# Patient Record
Sex: MALE | Race: WHITE | NOT HISPANIC OR LATINO | URBAN - METROPOLITAN AREA
[De-identification: names, ages, dates, MRNs, and addresses within clinical notes are randomized per-mention and may not be internally consistent; named-entity substitution may affect disease eponyms.]

---

## 2018-08-01 ENCOUNTER — EMERGENCY (EMERGENCY)
Facility: HOSPITAL | Age: 63
LOS: 0 days | Discharge: HOME | End: 2018-08-01
Attending: EMERGENCY MEDICINE | Admitting: EMERGENCY MEDICINE

## 2018-08-01 VITALS
TEMPERATURE: 98 F | HEART RATE: 57 BPM | SYSTOLIC BLOOD PRESSURE: 94 MMHG | RESPIRATION RATE: 18 BRPM | DIASTOLIC BLOOD PRESSURE: 54 MMHG | HEIGHT: 72 IN | OXYGEN SATURATION: 98 % | WEIGHT: 281.97 LBS

## 2018-08-01 DIAGNOSIS — E78.00 PURE HYPERCHOLESTEROLEMIA, UNSPECIFIED: ICD-10-CM

## 2018-08-01 DIAGNOSIS — R20.0 ANESTHESIA OF SKIN: ICD-10-CM

## 2018-08-01 DIAGNOSIS — G51.0 BELL'S PALSY: ICD-10-CM

## 2018-08-01 DIAGNOSIS — I10 ESSENTIAL (PRIMARY) HYPERTENSION: ICD-10-CM

## 2018-08-01 NOTE — ED PROVIDER NOTE - NEUROLOGICAL COORDINATION
Large Joint Aspiration/Injection  Date/Time: 3/13/2017 1:03 PM  Performed by: WALE BENTON  Authorized by: WALE BENTON     Consent Done?:  Yes (Verbal)  Indications:  Pain  Procedure site marked: Yes    Timeout: Prior to procedure the correct patient, procedure, and site was verified      Location:  Knee  Site:  R knee and L knee  Prep: Patient was prepped and draped in usual sterile fashion    Needle size:  20 G  Approach:  Anterolateral  Medications:  16 mg hyaluronate 16 mg/2 mL; 16 mg hyaluronate 16 mg/2 mL  Patient tolerance:  Patient tolerated the procedure well with no immediate complications      
normal

## 2018-08-01 NOTE — ED PROVIDER NOTE - NS ED ROS FT
Review of Systems    Constitutional: (-) fever/ chills (-) weight loss  Eyes/ENT: (-) blurry vision, (-) epistaxis (-) sore throat (-) ear pain  Cardiovascular: (-) chest pain, (-) syncope (-) palpitations  Respiratory: (-) cough, (-) shortness of breath  Gastrointestinal: (-) vomiting, (-) diarrhea (-) abdominal pain  Musculoskeletal: (-) neck pain, (-) back pain, (-) joint pain (-) pedal edema   Integumentary: (-) rash, (-) swelling  Neurological: (-) headache, (-) altered mental status +facial droop  Psychiatric: (-) hallucinations or depression   Allergic/Immunologic: (-) pruritus

## 2018-08-01 NOTE — ED PROVIDER NOTE - MEDICAL DECISION MAKING DETAILS
Chart finished.  61 yo man with Bell's palsy.  Will need outpatient Lyme testing, etc.  Corticosteroids and discharge.

## 2018-08-01 NOTE — ED ADULT TRIAGE NOTE - CHIEF COMPLAINT QUOTE
Per patient "I think I have [place] Palsy. My smile is off and I feel like when I am drinking , I start to drool and my face feels a little numb" CAITLYN Waller made aware and is assessing patient during triage

## 2018-08-01 NOTE — ED PROVIDER NOTE - ATTENDING CONTRIBUTION TO CARE
I have personally performed a history and physical exam on this patient and personally directed the management of the patient with CAITLYN Montero.  61 yo man with right sided Bell's Palsy.  History of similar in the past.  Already had Rx called in by PMD.  Came in because NP in office was debating imaging of patient.  I feel strongly this is Bell's palsy and do not feel ED imaging is required.  We discussed artifical tears and lacrilube and eye patch at nighttime.  Neurology follow up.

## 2018-08-01 NOTE — ED ADULT NURSE NOTE - CHIEF COMPLAINT QUOTE
Per patient "I think I have Wend[place] Palsy. My smile is off and I feel like when I am drinking , I start to drool and my face feels a little numb" CAITLYN Waller made aware and is assessing patient during triage

## 2018-08-27 PROBLEM — E78.00 PURE HYPERCHOLESTEROLEMIA, UNSPECIFIED: Chronic | Status: ACTIVE | Noted: 2018-08-01

## 2018-08-27 PROBLEM — I10 ESSENTIAL (PRIMARY) HYPERTENSION: Chronic | Status: ACTIVE | Noted: 2018-08-01

## 2018-08-27 PROBLEM — M48.00 SPINAL STENOSIS, SITE UNSPECIFIED: Chronic | Status: ACTIVE | Noted: 2018-08-01

## 2018-08-28 PROBLEM — Z00.00 ENCOUNTER FOR PREVENTIVE HEALTH EXAMINATION: Noted: 2018-08-28

## 2018-09-04 ENCOUNTER — APPOINTMENT (OUTPATIENT)
Dept: SURGERY | Facility: CLINIC | Age: 63
End: 2018-09-04
Payer: COMMERCIAL

## 2018-09-04 VITALS
WEIGHT: 280 LBS | SYSTOLIC BLOOD PRESSURE: 138 MMHG | DIASTOLIC BLOOD PRESSURE: 86 MMHG | BODY MASS INDEX: 37.93 KG/M2 | HEIGHT: 72 IN

## 2018-09-04 PROCEDURE — 99213 OFFICE O/P EST LOW 20 MIN: CPT

## 2018-09-27 ENCOUNTER — APPOINTMENT (OUTPATIENT)
Dept: SURGERY | Facility: CLINIC | Age: 63
End: 2018-09-27

## 2018-10-11 ENCOUNTER — APPOINTMENT (OUTPATIENT)
Dept: SURGERY | Facility: CLINIC | Age: 63
End: 2018-10-11
Payer: COMMERCIAL

## 2018-10-11 VITALS
HEIGHT: 72 IN | DIASTOLIC BLOOD PRESSURE: 78 MMHG | BODY MASS INDEX: 37.79 KG/M2 | WEIGHT: 279 LBS | SYSTOLIC BLOOD PRESSURE: 134 MMHG

## 2018-10-11 PROCEDURE — 99213 OFFICE O/P EST LOW 20 MIN: CPT

## 2018-10-22 ENCOUNTER — FORM ENCOUNTER (OUTPATIENT)
Age: 63
End: 2018-10-22

## 2018-10-23 ENCOUNTER — APPOINTMENT (OUTPATIENT)
Dept: MRI IMAGING | Facility: HOSPITAL | Age: 63
End: 2018-10-23
Payer: COMMERCIAL

## 2018-10-23 ENCOUNTER — APPOINTMENT (OUTPATIENT)
Dept: OTOLARYNGOLOGY | Facility: CLINIC | Age: 63
End: 2018-10-23
Payer: COMMERCIAL

## 2018-10-23 ENCOUNTER — TRANSCRIPTION ENCOUNTER (OUTPATIENT)
Age: 63
End: 2018-10-23

## 2018-10-23 ENCOUNTER — RESULT REVIEW (OUTPATIENT)
Age: 63
End: 2018-10-23

## 2018-10-23 ENCOUNTER — OUTPATIENT (OUTPATIENT)
Dept: OUTPATIENT SERVICES | Facility: HOSPITAL | Age: 63
LOS: 1 days | End: 2018-10-23
Payer: COMMERCIAL

## 2018-10-23 VITALS
HEIGHT: 72 IN | BODY MASS INDEX: 37.65 KG/M2 | TEMPERATURE: 96.9 F | OXYGEN SATURATION: 97 % | DIASTOLIC BLOOD PRESSURE: 88 MMHG | SYSTOLIC BLOOD PRESSURE: 137 MMHG | HEART RATE: 57 BPM | WEIGHT: 278 LBS

## 2018-10-23 DIAGNOSIS — H49.00 THIRD [OCULOMOTOR] NERVE PALSY, UNSPECIFIED EYE: ICD-10-CM

## 2018-10-23 DIAGNOSIS — R42 DIZZINESS AND GIDDINESS: ICD-10-CM

## 2018-10-23 DIAGNOSIS — H81.02 MENIERE'S DISEASE, LEFT EAR: ICD-10-CM

## 2018-10-23 PROCEDURE — 10021 FNA BX W/O IMG GDN 1ST LES: CPT

## 2018-10-23 PROCEDURE — 88305 TISSUE EXAM BY PATHOLOGIST: CPT

## 2018-10-23 PROCEDURE — 88173 CYTOPATH EVAL FNA REPORT: CPT

## 2018-10-23 PROCEDURE — 70543 MRI ORBT/FAC/NCK W/O &W/DYE: CPT

## 2018-10-23 PROCEDURE — A9585: CPT

## 2018-10-23 PROCEDURE — 70543 MRI ORBT/FAC/NCK W/O &W/DYE: CPT | Mod: 26

## 2018-10-23 PROCEDURE — 99244 OFF/OP CNSLTJ NEW/EST MOD 40: CPT | Mod: 25

## 2018-10-25 ENCOUNTER — OUTPATIENT (OUTPATIENT)
Dept: OUTPATIENT SERVICES | Facility: HOSPITAL | Age: 63
LOS: 1 days | Discharge: HOME | End: 2018-10-25

## 2018-10-25 VITALS
DIASTOLIC BLOOD PRESSURE: 70 MMHG | WEIGHT: 281.53 LBS | OXYGEN SATURATION: 96 % | HEIGHT: 72 IN | HEART RATE: 55 BPM | RESPIRATION RATE: 17 BRPM | TEMPERATURE: 98 F | SYSTOLIC BLOOD PRESSURE: 147 MMHG

## 2018-10-25 DIAGNOSIS — K43.2 INCISIONAL HERNIA WITHOUT OBSTRUCTION OR GANGRENE: ICD-10-CM

## 2018-10-25 DIAGNOSIS — Z90.49 ACQUIRED ABSENCE OF OTHER SPECIFIED PARTS OF DIGESTIVE TRACT: Chronic | ICD-10-CM

## 2018-10-25 DIAGNOSIS — Z01.818 ENCOUNTER FOR OTHER PREPROCEDURAL EXAMINATION: ICD-10-CM

## 2018-10-25 DIAGNOSIS — Z96.649 PRESENCE OF UNSPECIFIED ARTIFICIAL HIP JOINT: Chronic | ICD-10-CM

## 2018-10-25 DIAGNOSIS — Z98.890 OTHER SPECIFIED POSTPROCEDURAL STATES: Chronic | ICD-10-CM

## 2018-10-25 LAB
APTT BLD: 32.4 SEC — SIGNIFICANT CHANGE UP (ref 27–39.2)
INR BLD: 1 RATIO — SIGNIFICANT CHANGE UP (ref 0.65–1.3)
NON-GYNECOLOGICAL CYTOLOGY STUDY: SIGNIFICANT CHANGE UP
PROTHROM AB SERPL-ACNC: 11.5 SEC — SIGNIFICANT CHANGE UP (ref 9.95–12.87)

## 2018-10-25 RX ORDER — FAMOTIDINE 10 MG/ML
1 INJECTION INTRAVENOUS
Qty: 0 | Refills: 0 | COMMUNITY

## 2018-10-25 NOTE — H&P PST ADULT - PSH
H/O hemorrhoidectomy  2013  History of laparoscopic cholecystectomy  2017  History of total hip replacement  left 2002

## 2018-10-25 NOTE — H&P PST ADULT - FAMILY HISTORY
Father  Still living? Unknown  COPD (chronic obstructive pulmonary disease), Age at diagnosis: Age Unknown     Mother  Still living? No  COPD (chronic obstructive pulmonary disease), Age at diagnosis: Age Unknown

## 2018-10-25 NOTE — H&P PST ADULT - REASON FOR ADMISSION
64 Y/O MALE HERE FOR PRE-ADMISSION SURGICAL TESTING. PATIENT REPORTS HE HAD A LAPAROSCOPIC CHOLECYSTECTOMY IN 2017. REPORTS RECENT HERNIA NEAR INCISION.  NOW FOR SCHEDULED PROCEDURE.

## 2018-10-25 NOTE — H&P PST ADULT - PMH
Bell palsy  8/18  GERD (gastroesophageal reflux disease)    High blood cholesterol    Hypertension    OA (osteoarthritis)    Obesity  BMI=38.2  LAURIE on CPAP    Spinal stenosis

## 2018-10-26 PROBLEM — E66.9 OBESITY, UNSPECIFIED: Chronic | Status: ACTIVE | Noted: 2018-10-25

## 2018-11-06 ENCOUNTER — RESULT REVIEW (OUTPATIENT)
Age: 63
End: 2018-11-06

## 2018-11-06 ENCOUNTER — APPOINTMENT (OUTPATIENT)
Dept: SURGERY | Facility: AMBULATORY SURGERY CENTER | Age: 63
End: 2018-11-06

## 2018-11-06 ENCOUNTER — OUTPATIENT (OUTPATIENT)
Dept: OUTPATIENT SERVICES | Facility: HOSPITAL | Age: 63
LOS: 1 days | Discharge: HOME | End: 2018-11-06
Payer: COMMERCIAL

## 2018-11-06 VITALS
RESPIRATION RATE: 20 BRPM | OXYGEN SATURATION: 98 % | TEMPERATURE: 98 F | SYSTOLIC BLOOD PRESSURE: 143 MMHG | HEART RATE: 53 BPM | HEIGHT: 72 IN | DIASTOLIC BLOOD PRESSURE: 68 MMHG | WEIGHT: 281.53 LBS

## 2018-11-06 VITALS
DIASTOLIC BLOOD PRESSURE: 92 MMHG | RESPIRATION RATE: 16 BRPM | SYSTOLIC BLOOD PRESSURE: 145 MMHG | OXYGEN SATURATION: 96 % | HEART RATE: 61 BPM

## 2018-11-06 DIAGNOSIS — Z96.649 PRESENCE OF UNSPECIFIED ARTIFICIAL HIP JOINT: Chronic | ICD-10-CM

## 2018-11-06 DIAGNOSIS — Z90.49 ACQUIRED ABSENCE OF OTHER SPECIFIED PARTS OF DIGESTIVE TRACT: Chronic | ICD-10-CM

## 2018-11-06 DIAGNOSIS — Z98.890 OTHER SPECIFIED POSTPROCEDURAL STATES: Chronic | ICD-10-CM

## 2018-11-06 PROCEDURE — 49560: CPT

## 2018-11-06 PROCEDURE — 12032 INTMD RPR S/A/T/EXT 2.6-7.5: CPT | Mod: 59

## 2018-11-06 PROCEDURE — 49568: CPT

## 2018-11-06 RX ORDER — ONDANSETRON 8 MG/1
4 TABLET, FILM COATED ORAL ONCE
Qty: 0 | Refills: 0 | Status: DISCONTINUED | OUTPATIENT
Start: 2018-11-06 | End: 2018-11-21

## 2018-11-06 RX ORDER — MORPHINE SULFATE 50 MG/1
2 CAPSULE, EXTENDED RELEASE ORAL
Qty: 0 | Refills: 0 | Status: DISCONTINUED | OUTPATIENT
Start: 2018-11-06 | End: 2018-11-06

## 2018-11-06 RX ORDER — OXYCODONE AND ACETAMINOPHEN 5; 325 MG/1; MG/1
1 TABLET ORAL EVERY 4 HOURS
Qty: 0 | Refills: 0 | Status: DISCONTINUED | OUTPATIENT
Start: 2018-11-06 | End: 2018-11-06

## 2018-11-06 RX ORDER — SODIUM CHLORIDE 9 MG/ML
1000 INJECTION, SOLUTION INTRAVENOUS
Qty: 0 | Refills: 0 | Status: DISCONTINUED | OUTPATIENT
Start: 2018-11-06 | End: 2018-11-21

## 2018-11-06 RX ADMIN — MORPHINE SULFATE 2 MILLIGRAM(S): 50 CAPSULE, EXTENDED RELEASE ORAL at 14:46

## 2018-11-06 RX ADMIN — OXYCODONE AND ACETAMINOPHEN 1 TABLET(S): 5; 325 TABLET ORAL at 14:47

## 2018-11-06 RX ADMIN — SODIUM CHLORIDE 100 MILLILITER(S): 9 INJECTION, SOLUTION INTRAVENOUS at 13:46

## 2018-11-06 RX ADMIN — OXYCODONE AND ACETAMINOPHEN 1 TABLET(S): 5; 325 TABLET ORAL at 14:46

## 2018-11-06 RX ADMIN — MORPHINE SULFATE 2 MILLIGRAM(S): 50 CAPSULE, EXTENDED RELEASE ORAL at 13:48

## 2018-11-06 NOTE — CHART NOTE - NSCHARTNOTEFT_GEN_A_CORE
PACU ANESTHESIA ADMISSION NOTE      Procedure: repair of incisional hernia with mesh  Post op diagnosis: incisional hernia     ____  Intubated  TV:______       Rate: ______      FiO2: ______    __x__  Patent Airway    __x__  Full return of protective reflexes    __x__  Full recovery from anesthesia / back to baseline     Vitals:   T:      97.8     R:     24             BP:    131/72              Sat:   99%                P: 57      Mental Status:  __x__ Awake   ___x__ Alert   _____ Drowsy   _____ Sedated    Nausea/Vomiting:  _x___ NO  ______Yes,   See Post - Op Orders          Pain Scale (0-10):  ___0__    Treatment: ____ None    ____ See Post - Op/PCA Orders    Post - Operative Fluids:   ____ Oral   ____x See Post - Op Orders    Plan: Discharge:   __x__Home       _____Floor     _____Critical Care    _____  Other:_________________    Comments: Pt with no acute s/s of anesthesia complications. Report endorsed to RN.

## 2018-11-06 NOTE — BRIEF OPERATIVE NOTE - PROCEDURE
<<-----Click on this checkbox to enter Procedure Incisional hernia repair with mesh  11/06/2018    Active  MAURICE

## 2018-11-07 PROBLEM — G47.33 OBSTRUCTIVE SLEEP APNEA (ADULT) (PEDIATRIC): Chronic | Status: ACTIVE | Noted: 2018-10-25

## 2018-11-07 PROBLEM — G51.0 BELL'S PALSY: Chronic | Status: ACTIVE | Noted: 2018-10-25

## 2018-11-07 PROBLEM — M19.90 UNSPECIFIED OSTEOARTHRITIS, UNSPECIFIED SITE: Chronic | Status: ACTIVE | Noted: 2018-10-25

## 2018-11-07 PROBLEM — K21.9 GASTRO-ESOPHAGEAL REFLUX DISEASE WITHOUT ESOPHAGITIS: Chronic | Status: ACTIVE | Noted: 2018-10-25

## 2018-11-08 LAB — SURGICAL PATHOLOGY STUDY: SIGNIFICANT CHANGE UP

## 2018-11-09 DIAGNOSIS — K43.2 INCISIONAL HERNIA WITHOUT OBSTRUCTION OR GANGRENE: ICD-10-CM

## 2018-11-09 DIAGNOSIS — E78.00 PURE HYPERCHOLESTEROLEMIA, UNSPECIFIED: ICD-10-CM

## 2018-11-09 DIAGNOSIS — G47.33 OBSTRUCTIVE SLEEP APNEA (ADULT) (PEDIATRIC): ICD-10-CM

## 2018-11-09 DIAGNOSIS — E66.9 OBESITY, UNSPECIFIED: ICD-10-CM

## 2018-11-09 DIAGNOSIS — I10 ESSENTIAL (PRIMARY) HYPERTENSION: ICD-10-CM

## 2018-11-19 ENCOUNTER — APPOINTMENT (OUTPATIENT)
Dept: SURGERY | Facility: CLINIC | Age: 63
End: 2018-11-19
Payer: COMMERCIAL

## 2018-11-19 VITALS
SYSTOLIC BLOOD PRESSURE: 122 MMHG | WEIGHT: 281 LBS | HEIGHT: 72 IN | BODY MASS INDEX: 38.06 KG/M2 | DIASTOLIC BLOOD PRESSURE: 80 MMHG

## 2018-11-19 PROCEDURE — 99024 POSTOP FOLLOW-UP VISIT: CPT

## 2018-12-17 ENCOUNTER — RESULT REVIEW (OUTPATIENT)
Age: 63
End: 2018-12-17

## 2018-12-17 ENCOUNTER — OUTPATIENT (OUTPATIENT)
Dept: OUTPATIENT SERVICES | Facility: HOSPITAL | Age: 63
LOS: 1 days | Discharge: HOME | End: 2018-12-17

## 2018-12-17 ENCOUNTER — APPOINTMENT (OUTPATIENT)
Dept: OTOLARYNGOLOGY | Facility: AMBULATORY SURGERY CENTER | Age: 63
End: 2018-12-17
Payer: COMMERCIAL

## 2018-12-17 DIAGNOSIS — Z98.890 OTHER SPECIFIED POSTPROCEDURAL STATES: Chronic | ICD-10-CM

## 2018-12-17 DIAGNOSIS — Z90.49 ACQUIRED ABSENCE OF OTHER SPECIFIED PARTS OF DIGESTIVE TRACT: Chronic | ICD-10-CM

## 2018-12-17 DIAGNOSIS — Z96.649 PRESENCE OF UNSPECIFIED ARTIFICIAL HIP JOINT: Chronic | ICD-10-CM

## 2018-12-17 PROCEDURE — 10021 FNA BX W/O IMG GDN 1ST LES: CPT

## 2018-12-19 LAB — NON-GYNECOLOGICAL CYTOLOGY STUDY: SIGNIFICANT CHANGE UP

## 2019-01-03 ENCOUNTER — APPOINTMENT (OUTPATIENT)
Dept: SURGERY | Facility: CLINIC | Age: 64
End: 2019-01-03
Payer: COMMERCIAL

## 2019-01-03 VITALS
DIASTOLIC BLOOD PRESSURE: 82 MMHG | HEIGHT: 72 IN | WEIGHT: 282 LBS | SYSTOLIC BLOOD PRESSURE: 140 MMHG | BODY MASS INDEX: 38.19 KG/M2

## 2019-01-03 PROCEDURE — 99024 POSTOP FOLLOW-UP VISIT: CPT

## 2019-04-01 ENCOUNTER — TRANSCRIPTION ENCOUNTER (OUTPATIENT)
Age: 64
End: 2019-04-01

## 2019-04-11 ENCOUNTER — APPOINTMENT (OUTPATIENT)
Dept: SURGERY | Facility: CLINIC | Age: 64
End: 2019-04-11
Payer: COMMERCIAL

## 2019-04-11 VITALS
DIASTOLIC BLOOD PRESSURE: 78 MMHG | WEIGHT: 277 LBS | HEIGHT: 72 IN | SYSTOLIC BLOOD PRESSURE: 136 MMHG | BODY MASS INDEX: 37.52 KG/M2

## 2019-04-11 DIAGNOSIS — K43.2 INCISIONAL HERNIA W/OUT OBSTRUCTION OR GANGRENE: ICD-10-CM

## 2019-04-11 DIAGNOSIS — G51.0 BELL'S PALSY: ICD-10-CM

## 2019-04-11 PROCEDURE — 99212 OFFICE O/P EST SF 10 MIN: CPT

## 2019-04-11 NOTE — PHYSICAL EXAM
[Abdominal Masses] : No abdominal masses [Abdomen Tenderness] : ~T ~M No abdominal tenderness [No HSM] : no hepatosplenomegaly [de-identified] : Obese and protuberant but soft. Midline incision continues to heal without problems. The seroma has resolved and the hernia repair is intact. Minimal residual postsurgical changes or palpable. The large diastases recti is unchanged and not problematic. No inguinal hernias are noted.

## 2019-04-11 NOTE — HISTORY OF PRESENT ILLNESS
[de-identified] : Pt feels well overall.  No new problems reported.  Right Holder's Palsy effects much improved.  He had a biopsy of a lipoma in his right cheek by an ENT in [place] which was benign and no surgery was advised.

## 2022-06-22 ENCOUNTER — APPOINTMENT (OUTPATIENT)
Age: 67
End: 2022-06-22

## 2022-06-22 DIAGNOSIS — G47.33 OBSTRUCTIVE SLEEP APNEA (ADULT) (PEDIATRIC): ICD-10-CM

## 2022-06-22 PROCEDURE — 99212 OFFICE O/P EST SF 10 MIN: CPT | Mod: 95

## 2022-06-22 NOTE — ASSESSMENT
[FreeTextEntry1] : Assessment:\par LAURIE\par Obesity\par \par PLAN:\par The patient is benefitting from the PAP device . Unit broken beyond repair\par New supplies ordered \par Weight loss discussed\par I stressed the need maintain compliance  with the PAP device.\par The patient is not to use an Ozone or UV sterilizer. \par F/U in 12 months\par \par total phone call  time 12min\par \par \par

## 2022-06-22 NOTE — REASON FOR VISIT
[Follow-Up] : a follow-up visit [Sleep Apnea] : sleep apnea [Obesity] : obesity [TextBox_44] : error message on the cpap

## 2022-06-22 NOTE — HISTORY OF PRESENT ILLNESS
[Home] : at home, [unfilled] , at the time of the visit. [Medical Office: ([facility])___] : at the medical office located in  [Verbal consent obtained from patient] : the patient, [unfilled] [TextBox_4] : I reviewed all the previous sleep test that are available on file.\par I reviewed the previous office notes.\par

## 2022-10-17 ENCOUNTER — APPOINTMENT (OUTPATIENT)
Dept: CARDIOLOGY | Facility: CLINIC | Age: 67
End: 2022-10-17

## 2022-10-21 ENCOUNTER — APPOINTMENT (OUTPATIENT)
Dept: CARDIOLOGY | Facility: CLINIC | Age: 67
End: 2022-10-21

## 2022-10-21 VITALS — BODY MASS INDEX: 37.25 KG/M2 | WEIGHT: 275 LBS | HEIGHT: 72 IN

## 2022-10-21 DIAGNOSIS — G47.00 INSOMNIA, UNSPECIFIED: ICD-10-CM

## 2022-10-21 DIAGNOSIS — G45.0 VERTEBRO-BASILAR ARTERY SYNDROME: ICD-10-CM

## 2022-10-21 DIAGNOSIS — K21.9 GASTRO-ESOPHAGEAL REFLUX DISEASE W/OUT ESOPHAGITIS: ICD-10-CM

## 2022-10-21 DIAGNOSIS — K64.9 UNSPECIFIED HEMORRHOIDS: ICD-10-CM

## 2022-10-21 PROCEDURE — 99204 OFFICE O/P NEW MOD 45 MIN: CPT

## 2022-10-21 PROCEDURE — 99214 OFFICE O/P EST MOD 30 MIN: CPT

## 2022-10-21 RX ORDER — ZOLPIDEM TARTRATE 10 MG/1
10 TABLET ORAL
Qty: 30 | Refills: 0 | Status: DISCONTINUED | COMMUNITY
Start: 2018-05-17 | End: 2022-10-21

## 2022-10-21 RX ORDER — AMLODIPINE BESYLATE 5 MG/1
5 TABLET ORAL
Qty: 90 | Refills: 0 | Status: DISCONTINUED | COMMUNITY
Start: 2018-05-21 | End: 2022-10-21

## 2022-10-21 RX ORDER — VALACYCLOVIR 1 G/1
1 TABLET, FILM COATED ORAL
Qty: 21 | Refills: 0 | Status: DISCONTINUED | COMMUNITY
Start: 2018-08-01 | End: 2022-10-21

## 2022-10-21 RX ORDER — METHYLPREDNISOLONE 4 MG/1
4 TABLET ORAL
Qty: 21 | Refills: 0 | Status: DISCONTINUED | COMMUNITY
Start: 2018-08-14 | End: 2022-10-21

## 2022-10-21 RX ORDER — PREDNISONE 20 MG/1
20 TABLET ORAL
Qty: 14 | Refills: 0 | Status: DISCONTINUED | COMMUNITY
Start: 2018-08-01 | End: 2022-10-21

## 2022-10-21 RX ORDER — FENOFIBRIC ACID 135 MG/1
135 CAPSULE, DELAYED RELEASE ORAL
Qty: 90 | Refills: 0 | Status: DISCONTINUED | COMMUNITY
Start: 2018-01-29 | End: 2022-10-21

## 2022-10-21 RX ORDER — VALSARTAN AND HYDROCHLOROTHIAZIDE 320; 25 MG/1; MG/1
320-25 TABLET, FILM COATED ORAL
Qty: 90 | Refills: 0 | Status: DISCONTINUED | COMMUNITY
Start: 2018-01-02 | End: 2022-10-21

## 2022-10-21 NOTE — PHYSICAL EXAM
[Normal S1, S2] : normal S1, S2 [Clear Lung Fields] : clear lung fields [Soft] : abdomen soft [de-identified] : RRR

## 2022-10-21 NOTE — HISTORY OF PRESENT ILLNESS
[FreeTextEntry1] : PT WITH CAD 60% PDA, HYPERTG, HTN, WCH, LAURIE ON CPAP, MULTIPLE HIP AND KNEE SURGERIES. PAF STARTED XARELTO  \par \par MONITOR in 7/222: No AFIB EPISODES \par pt gettting tired at times, pt deneis cp and denies sob, pt to stop aspirin \par echo: 2/22: MILD LVH, LVMI: 133 g/m2, GS: -17.9%

## 2022-11-01 ENCOUNTER — APPOINTMENT (OUTPATIENT)
Dept: CARDIOLOGY | Facility: CLINIC | Age: 67
End: 2022-11-01

## 2022-11-01 VITALS
WEIGHT: 275 LBS | HEART RATE: 61 BPM | HEIGHT: 72 IN | TEMPERATURE: 98.1 F | DIASTOLIC BLOOD PRESSURE: 90 MMHG | BODY MASS INDEX: 37.25 KG/M2 | SYSTOLIC BLOOD PRESSURE: 132 MMHG

## 2022-11-01 DIAGNOSIS — Z78.9 OTHER SPECIFIED HEALTH STATUS: ICD-10-CM

## 2022-11-01 DIAGNOSIS — Z82.49 FAMILY HISTORY OF ISCHEMIC HEART DISEASE AND OTHER DISEASES OF THE CIRCULATORY SYSTEM: ICD-10-CM

## 2022-11-01 DIAGNOSIS — Z80.1 FAMILY HISTORY OF MALIGNANT NEOPLASM OF TRACHEA, BRONCHUS AND LUNG: ICD-10-CM

## 2022-11-01 DIAGNOSIS — Z82.5 FAMILY HISTORY OF ASTHMA AND OTHER CHRONIC LOWER RESPIRATORY DISEASES: ICD-10-CM

## 2022-11-01 PROCEDURE — 93000 ELECTROCARDIOGRAM COMPLETE: CPT

## 2022-11-01 PROCEDURE — 99204 OFFICE O/P NEW MOD 45 MIN: CPT

## 2022-11-01 RX ORDER — ZOLPIDEM TARTRATE 10 MG/1
10 TABLET, FILM COATED ORAL
Refills: 0 | Status: COMPLETED | COMMUNITY
End: 2022-11-01

## 2022-11-01 RX ORDER — FAMOTIDINE 40 MG/1
40 TABLET, FILM COATED ORAL
Refills: 0 | Status: COMPLETED | COMMUNITY
End: 2022-11-01

## 2022-11-01 RX ORDER — TADALAFIL 10 MG/1
10 TABLET, FILM COATED ORAL
Refills: 0 | Status: COMPLETED | COMMUNITY
End: 2022-11-01

## 2022-11-01 RX ORDER — VALSARTAN AND HYDROCHLOROTHIAZIDE 320; 25 MG/1; MG/1
320-25 TABLET, FILM COATED ORAL
Refills: 0 | Status: COMPLETED | COMMUNITY
End: 2022-11-01

## 2022-11-01 RX ORDER — MINERAL OIL, PETROLATUM 425; 568 MG/G; MG/G
OINTMENT OPHTHALMIC
Qty: 1 | Refills: 3 | Status: COMPLETED | COMMUNITY
Start: 2018-10-23 | End: 2022-11-01

## 2022-11-01 RX ORDER — FAMOTIDINE 40 MG/5ML
40 POWDER, FOR SUSPENSION ORAL
Refills: 0 | Status: COMPLETED | COMMUNITY
End: 2022-11-01

## 2022-11-01 NOTE — CARDIOLOGY SUMMARY
[de-identified] : (11/1/2022) EKG sinus rhythm at 61 BPM, poor R-wave progression, no significant ST/T wave abnormalities. [de-identified] : (6/24/2022) 14 days MCOT showed no evidence of Atrial Fibrillation. Average heart 64 BPM. Range  BPM.         [de-identified] : (2/17/2022) EF 55-60% borderline LA dilatation. No significant valvular abnormalities.

## 2022-11-01 NOTE — HISTORY OF PRESENT ILLNESS
[FreeTextEntry1] : Hypertension, Morbid Obesity, Obstructive Sleep Apnea on CPAP compliant, Hyperlipidemia, new onset Atrial Fibrillation. Patient was diagnosed with Atrial Fibrillation with apple watch. She had an episode on 6/2/2022. I reviewed the tracing from 6/2/2022 at 7:20 pm and 8:40 pm and it appears that the patient was in Atrial Fibrillation for 1 hour and 20 minutes. No other episode suggestive of Atrial Fibrillation.\par \par Patient CHADSVASC Score is 2 and he was started on Xarelto.  \par \par During the episode of AFib, patient felt a little bit of fluttering on the chest and felt funny. \par \par Patient has no chest pain, no shortness of breath, no lightheadedness, no dizziness, no palpitations, and no syncope. \par \par He presents for the evaluation of Atrial Fibrillation.

## 2022-11-01 NOTE — ADDENDUM
[FreeTextEntry1] : IRoyer assisted in documentation on 11/01/2022  acting as a scribe for Ladonna Martinez.\par

## 2022-11-01 NOTE — DISCUSSION/SUMMARY
[FreeTextEntry1] : Mr. Jeremiah Villa is a pleasant 67-year-old male with Hypertension, Hyperlipidemia, Morbid Obesity, Obstructive Sleep Apnea on CPAP, and new onset Paroxysmal Atrial Fibrillation. \par \par Atrial Fibrillation was diagnosed once with apple watch on 6/2/2022. Tracing reviewed showed Atrial Fibrillation lasting for 1 hour and 20 minutes most likely. Patient CHADSVASC Score is 2 and he is on Xarelto.\par \par I recommend to continue Xarelto for stroke prevention. I discussed with patient the strategy of reduction of stroke at length and deferred options for stroke prevention.\par \par I recommend to continue the same medication.  \par \par I discussed with patient the management strategy of Atrial Fibrillation at length including medical therapy antiemetic treatment and catheter ablation. I also discussed with patient at length the prevention strategy of Atrial Fibrillation including control of Hemoglobin A1C, Hypertension, Hyperlipidemia, daily exercise 30 minutes 5 days a week, avoiding alcohol, treating sleep apnea, and healthy eating and weight loss. Patient last Hemoglobin A1C 4.9 and last LDL 73.\par \par Patient and wife agreed for now for observation and monitoring AF burden using apple watch. Patient is not interested in an implantable recorder at this time. If patient has additional episode of AFib, I will consider him for Catheter Ablation.   \par \par I discussed with patient plan of care in great details. I answered all his questions to his satisfaction. Patient was pleased with the visit.\par \par Patient will follow with me in 6 months’ time. Please do not hesitate to contact me at [phone] if you have any further questions regarding this patient care.\par

## 2022-11-23 ENCOUNTER — APPOINTMENT (OUTPATIENT)
Dept: CARDIOLOGY | Facility: CLINIC | Age: 67
End: 2022-11-23

## 2022-11-23 VITALS — BODY MASS INDEX: 36.57 KG/M2 | HEIGHT: 72 IN | WEIGHT: 270 LBS

## 2022-11-23 PROCEDURE — 99213 OFFICE O/P EST LOW 20 MIN: CPT

## 2022-11-23 RX ORDER — ROSUVASTATIN CALCIUM 5 MG/1
5 TABLET, FILM COATED ORAL
Qty: 90 | Refills: 0 | Status: COMPLETED | COMMUNITY
Start: 2022-06-23

## 2022-11-23 RX ORDER — TADALAFIL 10 MG/1
10 TABLET, FILM COATED ORAL
Refills: 0 | Status: COMPLETED | COMMUNITY

## 2022-11-23 RX ORDER — KETOCONAZOLE 20 MG/G
2 CREAM TOPICAL
Qty: 60 | Refills: 0 | Status: COMPLETED | COMMUNITY
Start: 2022-07-26

## 2022-11-23 RX ORDER — COVID-19 ANTIGEN TEST
KIT MISCELLANEOUS
Qty: 8 | Refills: 0 | Status: COMPLETED | COMMUNITY
Start: 2022-08-29

## 2022-11-23 NOTE — PHYSICAL EXAM
[Normal S1, S2] : normal S1, S2 [Clear Lung Fields] : clear lung fields [Soft] : abdomen soft [de-identified] : RRR

## 2022-11-23 NOTE — HISTORY OF PRESENT ILLNESS
[FreeTextEntry1] : PT WITH CAD 60% PDA, HYPERTG, HTN, WCH, LAURIE ON CPAP, MULTIPLE HIP AND KNEE SURGERIES. PAF STARTED XARELTO  \par \par MONITOR in 7/222: No AFIB EPISODES \par pt gettting tired at times, pt deneis cp and denies sob, pt to stop aspirin \par echo: 2/22: MILD LVH, LVMI: 133 g/m2, GS: -17.9%\par \par 11/23/22: \par pt saw dr. sow and does not want ILR for now, pt had no episodes of afib on apple watch, pt was advised to continue for now \par checked BP CUFF ACCURATE WITH OFFICE, BP HIGH TODAY AND AT HOME 'S AT TIME.

## 2022-11-23 NOTE — DISCUSSION/SUMMARY
[FreeTextEntry1] : F/U IN 1 MONTH \par ? masked vs inaccurate \par bring cuff \par \par pt CUFF ACCURATE \par pt to lose weight \par continue xarelto for now \par get 2 d echo as LVH last time to see if improvement LVMI \par pt says sometimes misses lunch dose of hydralazine 4 times/week\par take daily \par no change if pressure staying avoe 140/90 consistently do best to take lunch dose of hydralazine. \par

## 2023-02-24 ENCOUNTER — APPOINTMENT (OUTPATIENT)
Dept: CARDIOLOGY | Facility: CLINIC | Age: 68
End: 2023-02-24
Payer: MEDICARE

## 2023-02-24 VITALS — DIASTOLIC BLOOD PRESSURE: 80 MMHG | SYSTOLIC BLOOD PRESSURE: 120 MMHG | HEART RATE: 60 BPM

## 2023-02-24 VITALS — BODY MASS INDEX: 36.98 KG/M2 | WEIGHT: 273 LBS | HEIGHT: 72 IN

## 2023-02-24 DIAGNOSIS — R00.2 PALPITATIONS: ICD-10-CM

## 2023-02-24 PROCEDURE — 99214 OFFICE O/P EST MOD 30 MIN: CPT

## 2023-03-08 ENCOUNTER — RESULT REVIEW (OUTPATIENT)
Age: 68
End: 2023-03-08

## 2023-03-08 ENCOUNTER — OUTPATIENT (OUTPATIENT)
Dept: OUTPATIENT SERVICES | Facility: HOSPITAL | Age: 68
LOS: 1 days | End: 2023-03-08
Payer: MEDICARE

## 2023-03-08 DIAGNOSIS — Z00.8 ENCOUNTER FOR OTHER GENERAL EXAMINATION: ICD-10-CM

## 2023-03-08 DIAGNOSIS — Z98.890 OTHER SPECIFIED POSTPROCEDURAL STATES: Chronic | ICD-10-CM

## 2023-03-08 DIAGNOSIS — Z90.49 ACQUIRED ABSENCE OF OTHER SPECIFIED PARTS OF DIGESTIVE TRACT: Chronic | ICD-10-CM

## 2023-03-08 DIAGNOSIS — Z96.649 PRESENCE OF UNSPECIFIED ARTIFICIAL HIP JOINT: Chronic | ICD-10-CM

## 2023-03-08 DIAGNOSIS — E78.5 HYPERLIPIDEMIA, UNSPECIFIED: ICD-10-CM

## 2023-03-08 PROCEDURE — 78452 HT MUSCLE IMAGE SPECT MULT: CPT

## 2023-03-08 PROCEDURE — A9500: CPT

## 2023-03-08 PROCEDURE — 78452 HT MUSCLE IMAGE SPECT MULT: CPT | Mod: 26,MH

## 2023-03-08 PROCEDURE — 93018 CV STRESS TEST I&R ONLY: CPT

## 2023-03-09 DIAGNOSIS — E78.5 HYPERLIPIDEMIA, UNSPECIFIED: ICD-10-CM

## 2023-03-16 DIAGNOSIS — R07.9 CHEST PAIN, UNSPECIFIED: ICD-10-CM

## 2023-03-17 DIAGNOSIS — R07.9 CHEST PAIN, UNSPECIFIED: ICD-10-CM

## 2023-03-31 NOTE — PHYSICAL EXAM
[Normal S1, S2] : normal S1, S2 [Clear Lung Fields] : clear lung fields [Soft] : abdomen soft [de-identified] : RRR

## 2023-03-31 NOTE — PHYSICAL EXAM
[Normal S1, S2] : normal S1, S2 [Clear Lung Fields] : clear lung fields [Soft] : abdomen soft [de-identified] : RRR

## 2023-03-31 NOTE — HISTORY OF PRESENT ILLNESS
[FreeTextEntry1] : PT WITH CAD 60% PDA, HYPERTG, HTN, WCH, LAURIE ON CPAP, MULTIPLE HIP AND KNEE SURGERIES. PAF STARTED XARELTO  LAURIE ON CPAP. \par \par MONITOR in : No AFIB EPISODES \par pt gettting tired at times, pt deneis cp and denies sob, pt to stop aspirin \par echo: : MILD LVH, LVMI: 133 g/m2, GS: -17.9%\par \par 22: \par pt saw dr. sow and does not want ILR for now, pt had no episodes of afib on apple watch, pt was advised to continue for now \par checked BP CUFF ACCURATE WITH OFFICE, BP HIGH TODAY AND AT HOME 'S AT TIME.\par \par 23: \par HDL: 50, T, LDL: 80, BNP: 10, TSH: 4.69 \par pt says takes a nap everyday, reviewed apple watch an no afib, had one listed in  but not afib. pt LAURIE on cpap.\par Pt feeling more tired and fatigued.

## 2023-03-31 NOTE — DISCUSSION/SUMMARY
[FreeTextEntry1] : F/U IN 1 MONTH \par ? masked vs inaccurate \par bring cuff \par \par pt CUFF ACCURATE \par pt to lose weight \par continue xarelto for now \par get 2 d echo as LVH last time to see if improvement LVMI \par pt says sometimes misses lunch dose of hydralazine 4 times/week\par take daily \par no change if pressure staying avoe 140/90 consistently do best to take lunch dose of hydralazine. \par \par 2/24/22\par pt bp ok here, pt here for f/u will get 2 d echo as bp at home high \par get stress nuclear \par

## 2023-04-14 ENCOUNTER — APPOINTMENT (OUTPATIENT)
Dept: CARDIOLOGY | Facility: CLINIC | Age: 68
End: 2023-04-14
Payer: MEDICARE

## 2023-04-14 PROCEDURE — 93306 TTE W/DOPPLER COMPLETE: CPT

## 2023-06-12 NOTE — ED ADULT NURSE NOTE - NSFALLRSKPASTHIST_ED_ALL_ED
no Patient noted to have severe constipation on CT scan without evidence of ovarian pathology or GI pathology.  Will plan to discharge with MiraLAX and recommend follow-up with a gastroenterologist.  Return precautions discussed and patient stable as she leaves.

## 2023-08-03 ENCOUNTER — APPOINTMENT (OUTPATIENT)
Dept: CARDIOLOGY | Facility: CLINIC | Age: 68
End: 2023-08-03
Payer: MEDICARE

## 2023-08-03 VITALS — HEART RATE: 60 BPM | SYSTOLIC BLOOD PRESSURE: 118 MMHG | DIASTOLIC BLOOD PRESSURE: 80 MMHG

## 2023-08-03 VITALS — WEIGHT: 273 LBS | HEIGHT: 72 IN | BODY MASS INDEX: 36.98 KG/M2

## 2023-08-03 PROCEDURE — 99214 OFFICE O/P EST MOD 30 MIN: CPT

## 2023-08-03 NOTE — HISTORY OF PRESENT ILLNESS
[FreeTextEntry1] : PT WITH CAD 60% PDA, HYPERTG, HTN, WCH, LAURIE ON CPAP, MULTIPLE HIP AND KNEE SURGERIES. PAF STARTED XARELTO  LAURIE ON CPAP. DD2  MONITOR in : No AFIB EPISODES  pt gettting tired at times, pt denies cp and denies sob, pt to stop aspirin  echo: : MILD LVH, LVMI: 133 g/m2, GS: -17.9%  22:  pt saw dr. sow and does not want ILR for now, pt had no episodes of afib on apple watch, pt was advised to continue for now  checked BP CUFF ACCURATE WITH OFFICE, BP HIGH TODAY AND AT HOME 'S AT TIME.  23:  HDL: 50, T, LDL: 80, BNP: 10, TSH: 4.69  pt says takes a nap everyday, reviewed apple watch an no afib, had one listed in  but not afib. pt LAURIE on cpap. Pt feeling more tired and fatigued.   8/3/23: 3/13/23: LEXISCAN: no evidence of myocardial ischemia or scar.  23: EF: 65%, E' sept: 0.09 m/s, E/e': 11, GLS: -19, CO: 6.4 l/min, DD2, aorta: 3.7  LDL 87 HDL 49  pt had afib after top gun movie in afib PT HAS NOT HAD AFIB AND HAS APPLE WATCH AND  hAD LAST EPISODE .

## 2023-08-03 NOTE — DISCUSSION/SUMMARY
[FreeTextEntry1] : pt CUFF ACCURATE  pt to lose weight  continue xarelto for now  LVH improved  if hr on watch no afib at next visit will consider stopping xarelto.  get bloodwork 6 months.

## 2023-08-03 NOTE — PHYSICAL EXAM
[Normal S1, S2] : normal S1, S2 [Clear Lung Fields] : clear lung fields [Soft] : abdomen soft [de-identified] : RRR

## 2023-08-07 ENCOUNTER — APPOINTMENT (OUTPATIENT)
Dept: ELECTROPHYSIOLOGY | Facility: CLINIC | Age: 68
End: 2023-08-07
Payer: MEDICARE

## 2023-08-07 VITALS
SYSTOLIC BLOOD PRESSURE: 116 MMHG | HEART RATE: 64 BPM | DIASTOLIC BLOOD PRESSURE: 72 MMHG | TEMPERATURE: 98 F | BODY MASS INDEX: 37.25 KG/M2 | HEIGHT: 72 IN | WEIGHT: 275 LBS

## 2023-08-07 PROCEDURE — 93000 ELECTROCARDIOGRAM COMPLETE: CPT

## 2023-08-07 PROCEDURE — 99214 OFFICE O/P EST MOD 30 MIN: CPT

## 2023-08-07 RX ORDER — ASPIRIN ENTERIC COATED TABLETS 81 MG 81 MG/1
81 TABLET, DELAYED RELEASE ORAL
Refills: 0 | Status: COMPLETED | COMMUNITY
End: 2023-08-07

## 2023-08-07 RX ORDER — TERBINAFINE HYDROCHLORIDE 250 MG/1
250 TABLET ORAL
Refills: 0 | Status: ACTIVE | COMMUNITY

## 2023-08-07 RX ORDER — CHOLECALCIFEROL (VITAMIN D3) 50 MCG
2000 CAPSULE ORAL DAILY
Refills: 0 | Status: ACTIVE | COMMUNITY

## 2023-08-07 RX ORDER — MULTIVIT-MIN/FA/LYCOPEN/LUTEIN .4-300-25
TABLET ORAL DAILY
Refills: 0 | Status: ACTIVE | COMMUNITY

## 2023-08-07 RX ORDER — RIVAROXABAN 20 MG/1
20 TABLET, FILM COATED ORAL
Qty: 90 | Refills: 3 | Status: ACTIVE | COMMUNITY

## 2023-08-07 RX ORDER — NAPROXEN SODIUM 220 MG/1
TABLET ORAL DAILY
Refills: 0 | Status: ACTIVE | COMMUNITY

## 2023-08-07 RX ORDER — FAMOTIDINE 40 MG/1
40 TABLET, FILM COATED ORAL DAILY
Refills: 0 | Status: ACTIVE | COMMUNITY

## 2023-08-07 RX ORDER — ZOLPIDEM TARTRATE 10 MG/1
10 TABLET ORAL
Qty: 30 | Refills: 2 | Status: ACTIVE | COMMUNITY

## 2023-08-07 NOTE — CARDIOLOGY SUMMARY
[de-identified] : (08/07/2023): NSR 64 bpm, 1st degree AV block  ms, LAD (11/1/2022) EKG sinus rhythm at 61 BPM, poor R-wave progression, no significant ST/T wave abnormalities. [de-identified] : (6/24/2022) 14 days MCOT showed no evidence of Atrial Fibrillation. Average heart 64 BPM. Range  BPM.         [de-identified] : SPECT 03/08/2023: a mild fixed inferior wall perfusion defect consistent with diaphragmatic attenuation artifact. [de-identified] : (2/17/2022) EF 55-60% borderline LA dilatation. No significant valvular abnormalities.

## 2023-08-07 NOTE — HISTORY OF PRESENT ILLNESS
[FreeTextEntry1] : Hypertension, Morbid Obesity, Obstructive Sleep Apnea on CPAP compliant, Hyperlipidemia, new onset Atrial Fibrillation. Patient was diagnosed with Atrial Fibrillation with apple watch. He had an episode on 6/2/2022. I reviewed the tracing from 6/2/2022 at 7:20 pm and 8:40 pm and it appears that the patient was in Atrial Fibrillation for 1 hour and 20 minutes. Had another episode on December 7, 2022 recorded on [employer]: review of episode likely sinus with APCs.  Patient CHADSVASC Score is 2 and he was started on Xarelto.    During the episode of AFib, patient felt a little bit of fluttering on the chest and felt funny.   Patient has no chest pain, no lightheadedness, no dizziness, no palpitations, and no syncope.  He experiences dyspnea when climbing stairs.  He presents for the evaluation of Atrial Fibrillation.

## 2023-08-07 NOTE — REVIEW OF SYSTEMS
[Negative] : Heme/Lymph [Dyspnea on exertion] : dyspnea during exertion [Chest Discomfort] : no chest discomfort [Lower Ext Edema] : no extremity edema [Leg Claudication] : no intermittent leg claudication [Palpitations] : no palpitations [Orthopnea] : no orthopnea [Syncope] : no syncope

## 2023-08-07 NOTE — REASON FOR VISIT
[Arrhythmia/ECG Abnorrmalities] : arrhythmia/ECG abnormalities [FreeTextEntry3] : Dr. Dami Trammell - Dr. Ronald Darden

## 2023-08-07 NOTE — DISCUSSION/SUMMARY
[FreeTextEntry1] : Mr. Jeremiah Villa is a pleasant 67-year-old male with Hypertension, Hyperlipidemia, Morbid Obesity, Obstructive Sleep Apnea on CPAP, and new onset Paroxysmal Atrial Fibrillation.   Atrial Fibrillation was diagnosed once with apple watch on 6/2/2022. Tracing reviewed showed Atrial Fibrillation lasting for 1 hour and 20 minutes most likely. Had another episode on December 7, 2022 likely sinus with PACs. Patient CHADSVASC Score is 2 and he is on Xarelto.  I recommend continuing Xarelto for stroke prevention. I discussed with patient the strategy of reduction of stroke at length and deferred options for stroke prevention.  I recommend continuing the same medication.    I discussed with patient the management strategy of Atrial Fibrillation at length including medical therapy antiemetic treatment and catheter ablation. I also discussed with patient at length the prevention strategy of Atrial Fibrillation including control of Hemoglobin A1C, Hypertension, Hyperlipidemia, daily exercise 30 minutes 5 days a week, avoiding alcohol, treating sleep apnea, and healthy eating and weight loss. Patient last Hemoglobin A1C 4.9 and last LDL 73.  Patient and wife agreed for now for observation and monitoring AF burden using apple watch. Patient is not interested in an implantable recorder at this time. If patient has additional episode of AFib, I will consider him for Catheter Ablation.     I discussed with patient plan of care in great details. I answered all his questions to his satisfaction. Patient was pleased with the visit.  Patient will follow with me in 12 months' time. Please do not hesitate to contact me at [phone] if you have any further questions regarding this patient care.  [EKG obtained to assist in diagnosis and management of assessed problem(s)] : EKG obtained to assist in diagnosis and management of assessed problem(s)

## 2024-01-18 ENCOUNTER — RX RENEWAL (OUTPATIENT)
Age: 69
End: 2024-01-18

## 2024-01-18 RX ORDER — VALSARTAN AND HYDROCHLOROTHIAZIDE 320; 25 MG/1; MG/1
320-25 TABLET, FILM COATED ORAL DAILY
Qty: 90 | Refills: 3 | Status: ACTIVE | COMMUNITY
Start: 1900-01-01 | End: 1900-01-01

## 2024-02-08 ENCOUNTER — APPOINTMENT (OUTPATIENT)
Dept: CARDIOLOGY | Facility: CLINIC | Age: 69
End: 2024-02-08
Payer: MEDICARE

## 2024-02-08 VITALS — DIASTOLIC BLOOD PRESSURE: 80 MMHG | HEART RATE: 65 BPM | SYSTOLIC BLOOD PRESSURE: 140 MMHG

## 2024-02-08 VITALS — BODY MASS INDEX: 38.65 KG/M2 | HEIGHT: 72 IN | WEIGHT: 285.38 LBS

## 2024-02-08 DIAGNOSIS — I50.32 CHRONIC DIASTOLIC (CONGESTIVE) HEART FAILURE: ICD-10-CM

## 2024-02-08 DIAGNOSIS — I10 ESSENTIAL (PRIMARY) HYPERTENSION: ICD-10-CM

## 2024-02-08 DIAGNOSIS — Z00.00 ENCOUNTER FOR GENERAL ADULT MEDICAL EXAMINATION W/OUT ABNORMAL FINDINGS: ICD-10-CM

## 2024-02-08 DIAGNOSIS — R00.1 BRADYCARDIA, UNSPECIFIED: ICD-10-CM

## 2024-02-08 DIAGNOSIS — E66.01 MORBID (SEVERE) OBESITY DUE TO EXCESS CALORIES: ICD-10-CM

## 2024-02-08 PROCEDURE — 99214 OFFICE O/P EST MOD 30 MIN: CPT

## 2024-02-08 RX ORDER — HYDRALAZINE HYDROCHLORIDE 100 MG/1
100 TABLET ORAL
Qty: 270 | Refills: 3 | Status: ACTIVE | COMMUNITY
Start: 1900-01-01 | End: 1900-01-01

## 2024-02-08 RX ORDER — MINERAL OIL, PETROLATUM 425; 568 MG/G; MG/G
OINTMENT OPHTHALMIC
Qty: 1 | Refills: 3 | Status: DISCONTINUED | COMMUNITY
Start: 2018-10-23 | End: 2024-02-08

## 2024-02-08 RX ORDER — FENOFIBRATE 134 MG/1
134 CAPSULE ORAL
Qty: 90 | Refills: 3 | Status: ACTIVE | COMMUNITY
Start: 1900-01-01 | End: 1900-01-01

## 2024-03-15 ENCOUNTER — OUTPATIENT (OUTPATIENT)
Dept: OUTPATIENT SERVICES | Facility: HOSPITAL | Age: 69
LOS: 1 days | End: 2024-03-15
Payer: MEDICARE

## 2024-03-15 ENCOUNTER — RESULT REVIEW (OUTPATIENT)
Age: 69
End: 2024-03-15

## 2024-03-15 DIAGNOSIS — Z98.890 OTHER SPECIFIED POSTPROCEDURAL STATES: Chronic | ICD-10-CM

## 2024-03-15 DIAGNOSIS — Z96.649 PRESENCE OF UNSPECIFIED ARTIFICIAL HIP JOINT: Chronic | ICD-10-CM

## 2024-03-15 DIAGNOSIS — Z90.49 ACQUIRED ABSENCE OF OTHER SPECIFIED PARTS OF DIGESTIVE TRACT: Chronic | ICD-10-CM

## 2024-03-15 DIAGNOSIS — Z00.8 ENCOUNTER FOR OTHER GENERAL EXAMINATION: ICD-10-CM

## 2024-03-15 DIAGNOSIS — E78.5 HYPERLIPIDEMIA, UNSPECIFIED: ICD-10-CM

## 2024-03-15 PROCEDURE — 75574 CT ANGIO HRT W/3D IMAGE: CPT | Mod: 26,MH

## 2024-03-15 PROCEDURE — 75574 CT ANGIO HRT W/3D IMAGE: CPT

## 2024-03-16 DIAGNOSIS — E78.5 HYPERLIPIDEMIA, UNSPECIFIED: ICD-10-CM

## 2024-03-17 ENCOUNTER — RESULT REVIEW (OUTPATIENT)
Age: 69
End: 2024-03-17

## 2024-03-18 ENCOUNTER — OUTPATIENT (OUTPATIENT)
Dept: OUTPATIENT SERVICES | Facility: HOSPITAL | Age: 69
LOS: 1 days | End: 2024-03-18
Payer: MEDICARE

## 2024-03-18 DIAGNOSIS — Z98.890 OTHER SPECIFIED POSTPROCEDURAL STATES: Chronic | ICD-10-CM

## 2024-03-18 DIAGNOSIS — Z90.49 ACQUIRED ABSENCE OF OTHER SPECIFIED PARTS OF DIGESTIVE TRACT: Chronic | ICD-10-CM

## 2024-03-18 DIAGNOSIS — Z96.649 PRESENCE OF UNSPECIFIED ARTIFICIAL HIP JOINT: Chronic | ICD-10-CM

## 2024-03-18 PROCEDURE — 75580 N-INVAS EST C FFR SW ALY CTA: CPT

## 2024-03-18 PROCEDURE — 75580 N-INVAS EST C FFR SW ALY CTA: CPT | Mod: 26

## 2024-03-18 RX ORDER — METOPROLOL SUCCINATE 25 MG/1
25 TABLET, EXTENDED RELEASE ORAL
Qty: 30 | Refills: 5 | Status: ACTIVE | COMMUNITY
Start: 2024-03-18 | End: 1900-01-01

## 2024-03-18 RX ORDER — METOPROLOL TARTRATE 100 MG/1
100 TABLET, FILM COATED ORAL
Qty: 2 | Refills: 0 | Status: DISCONTINUED | COMMUNITY
Start: 2024-02-08 | End: 2024-03-18

## 2024-03-18 NOTE — HISTORY OF PRESENT ILLNESS
[FreeTextEntry1] : PT WITH CAD 60% PDA cath in , PAF STARTED XARELTO  LAURIE ON CPAP. DD2 HYPERTG, HTN, WCH, LAURIE ON CPAP, MULTIPLE HIP AND KNEE SURGERIES.   BP CUFF ACCURATE WITH OFFICE MONITOR in : No AFIB EPISODES  pt gettting tired at times, pt denies cp and denies sob, pt to stop aspirin  echo: : MILD LVH, LVMI: 133 g/m2, GS: -17.9%  22:  pt saw dr. sow and does not want ILR for now, pt had no episodes of afib on apple watch, pt was advised to continue ac for now  checked, BP HIGH TODAY AND AT HOME 'S AT TIME.  23:  HDL: 50, T, LDL: 80, BNP: 10, TSH: 4.69  pt says takes a nap everyday, reviewed apple watch an no afib, had one listed in  but not afib. pt LAURIE on cpap. Pt feeling more tired and fatigued.   8/3/23: 3/13/23: LEXISCAN: no evidence of myocardial ischemia or scar.  23: EF: 65%, E' sept: 0.09 m/s, E/e': 11, GLS: -19, CO: 6.4 l/min, DD2, aorta: 3.7  LDL 87 HDL 49  pt had afib after watching top gun movie.  PT HAS NOT HAD AFIB AND HAS APPLE WATCH AND  hAD LAST EPISODE .   24: : TSh: elevated mild, GFR: 78 LDL 97 increased and HDL 42, bnp: 36  bp at home [id number] to 155 at home systolic. pt says if eats too much patient feels  reviewed apple watch NO AFIB SINCE .  pt says tired and using cpap but very tired. pt wife saying sob with walking up a hill or fast or going upstairs.   3/18/24:  CTA: CAC: 767  pLAD mod to severe stenosis, LCX:/LPDA: MODERATE TO SEVERE stenosis.  pt to start metoprolol er 25 mg daily, start ranolazine 500 mg po qd, start asa 81 mg daily.  schedule cath with dr. huber. d/w r/bs

## 2024-03-18 NOTE — DISCUSSION/SUMMARY
[FreeTextEntry1] : pt CUFF ACCURATE  pt advised to lose weight  continue fenofibrate 134 mg  increase 100 mg po tid  continue valsartan/hct 320/25 mg daily  increase rousvastatin to 10 mg po qhs  LVH improved  if hr on watch no afib at next visit will consider STOP XARELTO Continue Aspirin as CAD get bloodwork 6 months. CTA for sob  get echo  carotid start wegovy

## 2024-03-18 NOTE — PHYSICAL EXAM
[Normal S1, S2] : normal S1, S2 [Clear Lung Fields] : clear lung fields [Soft] : abdomen soft [de-identified] : RRR

## 2024-03-19 DIAGNOSIS — E78.5 HYPERLIPIDEMIA, UNSPECIFIED: ICD-10-CM

## 2024-03-20 DIAGNOSIS — E78.5 HYPERLIPIDEMIA, UNSPECIFIED: ICD-10-CM

## 2024-03-21 ENCOUNTER — OUTPATIENT (OUTPATIENT)
Dept: OUTPATIENT SERVICES | Facility: HOSPITAL | Age: 69
LOS: 1 days | End: 2024-03-21
Payer: MEDICARE

## 2024-03-21 VITALS
HEIGHT: 72 IN | HEART RATE: 52 BPM | RESPIRATION RATE: 16 BRPM | OXYGEN SATURATION: 97 % | TEMPERATURE: 98 F | SYSTOLIC BLOOD PRESSURE: 154 MMHG | WEIGHT: 279.99 LBS | DIASTOLIC BLOOD PRESSURE: 84 MMHG

## 2024-03-21 DIAGNOSIS — Z90.49 ACQUIRED ABSENCE OF OTHER SPECIFIED PARTS OF DIGESTIVE TRACT: Chronic | ICD-10-CM

## 2024-03-21 DIAGNOSIS — Z96.649 PRESENCE OF UNSPECIFIED ARTIFICIAL HIP JOINT: Chronic | ICD-10-CM

## 2024-03-21 DIAGNOSIS — Z98.890 OTHER SPECIFIED POSTPROCEDURAL STATES: Chronic | ICD-10-CM

## 2024-03-21 DIAGNOSIS — Z01.818 ENCOUNTER FOR OTHER PREPROCEDURAL EXAMINATION: ICD-10-CM

## 2024-03-21 DIAGNOSIS — I25.10 ATHEROSCLEROTIC HEART DISEASE OF NATIVE CORONARY ARTERY WITHOUT ANGINA PECTORIS: ICD-10-CM

## 2024-03-21 LAB
ALBUMIN SERPL ELPH-MCNC: 4.7 G/DL — SIGNIFICANT CHANGE UP (ref 3.5–5.2)
ALP SERPL-CCNC: 49 U/L — SIGNIFICANT CHANGE UP (ref 30–115)
ALT FLD-CCNC: 19 U/L — SIGNIFICANT CHANGE UP (ref 0–41)
ANION GAP SERPL CALC-SCNC: 15 MMOL/L — HIGH (ref 7–14)
APTT BLD: 31.3 SEC — SIGNIFICANT CHANGE UP (ref 27–39.2)
AST SERPL-CCNC: 19 U/L — SIGNIFICANT CHANGE UP (ref 0–41)
BASOPHILS # BLD AUTO: 0.05 K/UL — SIGNIFICANT CHANGE UP (ref 0–0.2)
BASOPHILS NFR BLD AUTO: 0.6 % — SIGNIFICANT CHANGE UP (ref 0–1)
BILIRUB SERPL-MCNC: 0.4 MG/DL — SIGNIFICANT CHANGE UP (ref 0.2–1.2)
BUN SERPL-MCNC: 17 MG/DL — SIGNIFICANT CHANGE UP (ref 10–20)
CALCIUM SERPL-MCNC: 9.3 MG/DL — SIGNIFICANT CHANGE UP (ref 8.4–10.5)
CHLORIDE SERPL-SCNC: 101 MMOL/L — SIGNIFICANT CHANGE UP (ref 98–110)
CO2 SERPL-SCNC: 22 MMOL/L — SIGNIFICANT CHANGE UP (ref 17–32)
CREAT SERPL-MCNC: 1 MG/DL — SIGNIFICANT CHANGE UP (ref 0.7–1.5)
EGFR: 81 ML/MIN/1.73M2 — SIGNIFICANT CHANGE UP
EOSINOPHIL # BLD AUTO: 0.21 K/UL — SIGNIFICANT CHANGE UP (ref 0–0.7)
EOSINOPHIL NFR BLD AUTO: 2.3 % — SIGNIFICANT CHANGE UP (ref 0–8)
GLUCOSE SERPL-MCNC: 72 MG/DL — SIGNIFICANT CHANGE UP (ref 70–99)
HCT VFR BLD CALC: 45.6 % — SIGNIFICANT CHANGE UP (ref 42–52)
HGB BLD-MCNC: 15.9 G/DL — SIGNIFICANT CHANGE UP (ref 14–18)
IMM GRANULOCYTES NFR BLD AUTO: 0.2 % — SIGNIFICANT CHANGE UP (ref 0.1–0.3)
INR BLD: 1.02 RATIO — SIGNIFICANT CHANGE UP (ref 0.65–1.3)
LYMPHOCYTES # BLD AUTO: 1.89 K/UL — SIGNIFICANT CHANGE UP (ref 1.2–3.4)
LYMPHOCYTES # BLD AUTO: 21.1 % — SIGNIFICANT CHANGE UP (ref 20.5–51.1)
MCHC RBC-ENTMCNC: 30.1 PG — SIGNIFICANT CHANGE UP (ref 27–31)
MCHC RBC-ENTMCNC: 34.9 G/DL — SIGNIFICANT CHANGE UP (ref 32–37)
MCV RBC AUTO: 86.2 FL — SIGNIFICANT CHANGE UP (ref 80–94)
MONOCYTES # BLD AUTO: 0.99 K/UL — HIGH (ref 0.1–0.6)
MONOCYTES NFR BLD AUTO: 11.1 % — HIGH (ref 1.7–9.3)
NEUTROPHILS # BLD AUTO: 5.78 K/UL — SIGNIFICANT CHANGE UP (ref 1.4–6.5)
NEUTROPHILS NFR BLD AUTO: 64.7 % — SIGNIFICANT CHANGE UP (ref 42.2–75.2)
NRBC # BLD: 0 /100 WBCS — SIGNIFICANT CHANGE UP (ref 0–0)
PLATELET # BLD AUTO: 283 K/UL — SIGNIFICANT CHANGE UP (ref 130–400)
PMV BLD: 9.9 FL — SIGNIFICANT CHANGE UP (ref 7.4–10.4)
POTASSIUM SERPL-MCNC: 4.3 MMOL/L — SIGNIFICANT CHANGE UP (ref 3.5–5)
POTASSIUM SERPL-SCNC: 4.3 MMOL/L — SIGNIFICANT CHANGE UP (ref 3.5–5)
PROT SERPL-MCNC: 6.8 G/DL — SIGNIFICANT CHANGE UP (ref 6–8)
PROTHROM AB SERPL-ACNC: 11.6 SEC — SIGNIFICANT CHANGE UP (ref 9.95–12.87)
RBC # BLD: 5.29 M/UL — SIGNIFICANT CHANGE UP (ref 4.7–6.1)
RBC # FLD: 13.6 % — SIGNIFICANT CHANGE UP (ref 11.5–14.5)
SODIUM SERPL-SCNC: 138 MMOL/L — SIGNIFICANT CHANGE UP (ref 135–146)
WBC # BLD: 8.94 K/UL — SIGNIFICANT CHANGE UP (ref 4.8–10.8)
WBC # FLD AUTO: 8.94 K/UL — SIGNIFICANT CHANGE UP (ref 4.8–10.8)

## 2024-03-21 PROCEDURE — 36415 COLL VENOUS BLD VENIPUNCTURE: CPT

## 2024-03-21 PROCEDURE — 93010 ELECTROCARDIOGRAM REPORT: CPT

## 2024-03-21 PROCEDURE — 99214 OFFICE O/P EST MOD 30 MIN: CPT | Mod: 25

## 2024-03-21 PROCEDURE — 93005 ELECTROCARDIOGRAM TRACING: CPT

## 2024-03-21 PROCEDURE — 85730 THROMBOPLASTIN TIME PARTIAL: CPT

## 2024-03-21 PROCEDURE — 85025 COMPLETE CBC W/AUTO DIFF WBC: CPT

## 2024-03-21 PROCEDURE — 85610 PROTHROMBIN TIME: CPT

## 2024-03-21 PROCEDURE — 80053 COMPREHEN METABOLIC PANEL: CPT

## 2024-03-21 RX ORDER — OMEGA-3 ACID ETHYL ESTERS 1 G
1400 CAPSULE ORAL
Qty: 0 | Refills: 0 | DISCHARGE

## 2024-03-21 RX ORDER — VALSARTAN 80 MG/1
1 TABLET ORAL
Qty: 0 | Refills: 0 | DISCHARGE

## 2024-03-21 RX ORDER — HYDROCHLOROTHIAZIDE 25 MG
1 TABLET ORAL
Qty: 0 | Refills: 0 | DISCHARGE

## 2024-03-21 RX ORDER — ATORVASTATIN CALCIUM 80 MG/1
1 TABLET, FILM COATED ORAL
Qty: 0 | Refills: 0 | DISCHARGE

## 2024-03-21 RX ORDER — AMLODIPINE BESYLATE 2.5 MG/1
1 TABLET ORAL
Qty: 0 | Refills: 0 | DISCHARGE

## 2024-03-21 NOTE — H&P PST ADULT - NSICDXPASTSURGICALHX_GEN_ALL_CORE_FT
PAST SURGICAL HISTORY:  H/O hemorrhoidectomy 2013    History of hernia repair     History of laparoscopic cholecystectomy 2017    History of total hip replacement left 2002

## 2024-03-21 NOTE — H&P PST ADULT - NSICDXPASTMEDICALHX_GEN_ALL_CORE_FT
PAST MEDICAL HISTORY:  Bell palsy 8/18    GERD (gastroesophageal reflux disease)     High blood cholesterol     Hypertension     OA (osteoarthritis)     Obesity BMI=38.2    LAURIE on CPAP     Paroxysmal atrial fibrillation     Spinal stenosis

## 2024-03-21 NOTE — H&P PST ADULT - NSICDXFAMILYHX_GEN_ALL_CORE_FT
FAMILY HISTORY:  Father  Still living? Unknown  COPD (chronic obstructive pulmonary disease), Age at diagnosis: Age Unknown    Mother  Still living? No  COPD (chronic obstructive pulmonary disease), Age at diagnosis: Age Unknown

## 2024-03-21 NOTE — H&P PST ADULT - HISTORY OF PRESENT ILLNESS
Calcified and noncalcified plaque throughout the proximal and mid LAD   resulting in up to moderate to severe narrowing within the mid segment.    Calcified and noncalcified plaque within the distal LCx/PDA resulting in   moderate to severe narrowing.    The total Agatston coronary artery calcium score equals 767, which   corresponds to 82 percentile for age 68yr old male with PMH  severe Obesity, LAURIE and HTN S/P [facility] 2003? states he recently had CTA and reports he was found to have mod -severe " blocked arteries " -presents to PAST in prep for Heart catheterization. H/o Afib -1 captured by his phone -Wally was d/cd 2months ago -no other episodes.  His CTA CAC revealed  "Calcified and noncalcified plaque throughout the proximal and mid LAD   resulting in up to moderate to severe narrowing within the mid segment.    Calcified and noncalcified plaque within the distal LCx/PDA resulting in   moderate to severe narrowing.    The total Agatston coronary artery calcium score equals 767, which   corresponds to 82 percentile for age"  Denies COVID /URI S/S.  Anesthesia Alert  YES--Difficult Airway Class 4  NO--History of neck surgery or radiation  NO--Limited ROM of neck  NO--History of Malignant hyperthermia  NO--Personal or family history of Pseudocholinesterase deficiency  NO--Prior Anesthesia Complication  NO--Latex Allergy  NO--Loose teeth  NO--History of Rheumatoid Arthritis  yes--LAURIE  No Bleeding risk  NO--Other___  Duke Activity Status Index (DASI) from [employer]  on 3/21/2024  ** All calculations should be rechecked by clinician prior to use **    RESULT SUMMARY:  28.7 points    6.27 METs        INPUTS:  Take care of self —> 2.75 = Yes  Walk indoors —> 1.75 = Yes  Walk 1&ndash;2 blocks on level ground —> 2.75 = Yes  Climb a flight of stairs or walk up a hill —> 5.5 = Yes  Run a short distance —> 0 = No  Do light work around the house —> 2.7 = Yes  Do moderate work around the house —> 3.5 = Yes  Do heavy work around the house —> 0 = No  Do yardwork —> 4.5 = Yes  Have sexual relations —> 5.25 = Yes  Participate in moderate recreational activities —> 0 = No  Participate in strenuous sports —> 0 = No  __   Revised Cardiac Risk Index for Pre-Operative Risk from [employer]  on 3/21/2024  ** All calculations should be rechecked by clinician prior to use **    RESULT SUMMARY:  0 points  Class I Risk    3.9 %  30-day risk of death, MI, or cardiac arrest        INPUTS:  Elevated-risk surgery —> 0 = No  History of ischemic heart disease —> 0 = No  History of congestive heart failure —> 0 = No  History of cerebrovascular disease —> 0 = No  Pre-operative treatment with insulin —> 0 = No  Pre-operative creatinine >2 mg/dL / 176.8 µmol/L —> 0 = No

## 2024-03-22 DIAGNOSIS — Z01.818 ENCOUNTER FOR OTHER PREPROCEDURAL EXAMINATION: ICD-10-CM

## 2024-03-22 DIAGNOSIS — I25.10 ATHEROSCLEROTIC HEART DISEASE OF NATIVE CORONARY ARTERY WITHOUT ANGINA PECTORIS: ICD-10-CM

## 2024-03-25 RX ORDER — HYDRALAZINE HCL 50 MG
1 TABLET ORAL
Refills: 0 | DISCHARGE

## 2024-03-27 ENCOUNTER — TRANSCRIPTION ENCOUNTER (OUTPATIENT)
Age: 69
End: 2024-03-27

## 2024-03-27 ENCOUNTER — OUTPATIENT (OUTPATIENT)
Dept: OUTPATIENT SERVICES | Facility: HOSPITAL | Age: 69
LOS: 1 days | Discharge: HOME | End: 2024-03-27
Payer: MEDICARE

## 2024-03-27 VITALS — DIASTOLIC BLOOD PRESSURE: 55 MMHG | SYSTOLIC BLOOD PRESSURE: 127 MMHG | HEART RATE: 66 BPM

## 2024-03-27 VITALS
OXYGEN SATURATION: 97 % | SYSTOLIC BLOOD PRESSURE: 169 MMHG | HEIGHT: 72 IN | DIASTOLIC BLOOD PRESSURE: 81 MMHG | WEIGHT: 285.06 LBS | TEMPERATURE: 97 F | RESPIRATION RATE: 14 BRPM | HEART RATE: 61 BPM

## 2024-03-27 DIAGNOSIS — Z98.890 OTHER SPECIFIED POSTPROCEDURAL STATES: Chronic | ICD-10-CM

## 2024-03-27 DIAGNOSIS — Z90.49 ACQUIRED ABSENCE OF OTHER SPECIFIED PARTS OF DIGESTIVE TRACT: Chronic | ICD-10-CM

## 2024-03-27 DIAGNOSIS — Z96.649 PRESENCE OF UNSPECIFIED ARTIFICIAL HIP JOINT: Chronic | ICD-10-CM

## 2024-03-27 DIAGNOSIS — I20.9 ANGINA PECTORIS, UNSPECIFIED: ICD-10-CM

## 2024-03-27 DIAGNOSIS — I25.10 ATHEROSCLEROTIC HEART DISEASE OF NATIVE CORONARY ARTERY WITHOUT ANGINA PECTORIS: ICD-10-CM

## 2024-03-27 LAB
ANION GAP SERPL CALC-SCNC: 11 MMOL/L — SIGNIFICANT CHANGE UP (ref 7–14)
BUN SERPL-MCNC: 20 MG/DL — SIGNIFICANT CHANGE UP (ref 10–20)
CALCIUM SERPL-MCNC: 9.6 MG/DL — SIGNIFICANT CHANGE UP (ref 8.4–10.5)
CHLORIDE SERPL-SCNC: 102 MMOL/L — SIGNIFICANT CHANGE UP (ref 98–110)
CO2 SERPL-SCNC: 24 MMOL/L — SIGNIFICANT CHANGE UP (ref 17–32)
CREAT SERPL-MCNC: 1.3 MG/DL — SIGNIFICANT CHANGE UP (ref 0.7–1.5)
EGFR: 59 ML/MIN/1.73M2 — LOW
GLUCOSE SERPL-MCNC: 98 MG/DL — SIGNIFICANT CHANGE UP (ref 70–99)
HCT VFR BLD CALC: 40.9 % — LOW (ref 42–52)
HCT VFR BLD CALC: 44.1 % — SIGNIFICANT CHANGE UP (ref 42–52)
HGB BLD-MCNC: 14.6 G/DL — SIGNIFICANT CHANGE UP (ref 14–18)
HGB BLD-MCNC: 15.6 G/DL — SIGNIFICANT CHANGE UP (ref 14–18)
MCHC RBC-ENTMCNC: 29.9 PG — SIGNIFICANT CHANGE UP (ref 27–31)
MCHC RBC-ENTMCNC: 30.5 PG — SIGNIFICANT CHANGE UP (ref 27–31)
MCHC RBC-ENTMCNC: 35.4 G/DL — SIGNIFICANT CHANGE UP (ref 32–37)
MCHC RBC-ENTMCNC: 35.7 G/DL — SIGNIFICANT CHANGE UP (ref 32–37)
MCV RBC AUTO: 84.5 FL — SIGNIFICANT CHANGE UP (ref 80–94)
MCV RBC AUTO: 85.4 FL — SIGNIFICANT CHANGE UP (ref 80–94)
NRBC # BLD: 0 /100 WBCS — SIGNIFICANT CHANGE UP (ref 0–0)
NRBC # BLD: 0 /100 WBCS — SIGNIFICANT CHANGE UP (ref 0–0)
PLATELET # BLD AUTO: 238 K/UL — SIGNIFICANT CHANGE UP (ref 130–400)
PLATELET # BLD AUTO: 272 K/UL — SIGNIFICANT CHANGE UP (ref 130–400)
PMV BLD: 9.5 FL — SIGNIFICANT CHANGE UP (ref 7.4–10.4)
PMV BLD: 9.5 FL — SIGNIFICANT CHANGE UP (ref 7.4–10.4)
POTASSIUM SERPL-MCNC: 4.7 MMOL/L — SIGNIFICANT CHANGE UP (ref 3.5–5)
POTASSIUM SERPL-SCNC: 4.7 MMOL/L — SIGNIFICANT CHANGE UP (ref 3.5–5)
RBC # BLD: 4.79 M/UL — SIGNIFICANT CHANGE UP (ref 4.7–6.1)
RBC # BLD: 5.22 M/UL — SIGNIFICANT CHANGE UP (ref 4.7–6.1)
RBC # FLD: 13.4 % — SIGNIFICANT CHANGE UP (ref 11.5–14.5)
RBC # FLD: 13.6 % — SIGNIFICANT CHANGE UP (ref 11.5–14.5)
SODIUM SERPL-SCNC: 137 MMOL/L — SIGNIFICANT CHANGE UP (ref 135–146)
WBC # BLD: 6.26 K/UL — SIGNIFICANT CHANGE UP (ref 4.8–10.8)
WBC # BLD: 7.64 K/UL — SIGNIFICANT CHANGE UP (ref 4.8–10.8)
WBC # FLD AUTO: 6.26 K/UL — SIGNIFICANT CHANGE UP (ref 4.8–10.8)
WBC # FLD AUTO: 7.64 K/UL — SIGNIFICANT CHANGE UP (ref 4.8–10.8)

## 2024-03-27 PROCEDURE — 36415 COLL VENOUS BLD VENIPUNCTURE: CPT

## 2024-03-27 PROCEDURE — 80048 BASIC METABOLIC PNL TOTAL CA: CPT

## 2024-03-27 PROCEDURE — C1725: CPT

## 2024-03-27 PROCEDURE — C1894: CPT

## 2024-03-27 PROCEDURE — 85027 COMPLETE CBC AUTOMATED: CPT

## 2024-03-27 PROCEDURE — 93458 L HRT ARTERY/VENTRICLE ANGIO: CPT | Mod: 59

## 2024-03-27 PROCEDURE — 92928 PRQ TCAT PLMT NTRAC ST 1 LES: CPT | Mod: LD

## 2024-03-27 PROCEDURE — C1769: CPT

## 2024-03-27 PROCEDURE — C1887: CPT

## 2024-03-27 PROCEDURE — C9600: CPT | Mod: LD

## 2024-03-27 PROCEDURE — C1874: CPT

## 2024-03-27 PROCEDURE — 93458 L HRT ARTERY/VENTRICLE ANGIO: CPT | Mod: 26,XU

## 2024-03-27 RX ORDER — ASPIRIN/CALCIUM CARB/MAGNESIUM 324 MG
81 TABLET ORAL ONCE
Refills: 0 | Status: COMPLETED | OUTPATIENT
Start: 2024-03-27 | End: 2024-03-27

## 2024-03-27 RX ORDER — ROSUVASTATIN CALCIUM 5 MG/1
1 TABLET ORAL
Refills: 0 | DISCHARGE

## 2024-03-27 RX ORDER — METOPROLOL TARTRATE 50 MG
1 TABLET ORAL
Refills: 0 | DISCHARGE

## 2024-03-27 RX ORDER — RANOLAZINE 500 MG/1
500 TABLET, FILM COATED, EXTENDED RELEASE ORAL
Refills: 0 | DISCHARGE

## 2024-03-27 RX ORDER — OMEGA-3 ACID ETHYL ESTERS 1 G
980 CAPSULE ORAL
Qty: 0 | Refills: 0 | DISCHARGE

## 2024-03-27 RX ORDER — CHLORHEXIDINE GLUCONATE 213 G/1000ML
1 SOLUTION TOPICAL ONCE
Refills: 0 | Status: DISCONTINUED | OUTPATIENT
Start: 2024-03-27 | End: 2024-03-27

## 2024-03-27 RX ORDER — HYDRALAZINE HCL 50 MG
1 TABLET ORAL
Refills: 0 | DISCHARGE

## 2024-03-27 RX ORDER — FENOFIBRATE,MICRONIZED 130 MG
1 CAPSULE ORAL
Qty: 0 | Refills: 0 | DISCHARGE

## 2024-03-27 RX ORDER — UBIDECARENONE 100 MG
1 CAPSULE ORAL
Qty: 0 | Refills: 0 | DISCHARGE

## 2024-03-27 RX ORDER — SODIUM CHLORIDE 9 MG/ML
900 INJECTION INTRAMUSCULAR; INTRAVENOUS; SUBCUTANEOUS
Refills: 0 | Status: DISCONTINUED | OUTPATIENT
Start: 2024-03-27 | End: 2024-03-27

## 2024-03-27 RX ORDER — SODIUM CHLORIDE 9 MG/ML
250 INJECTION INTRAMUSCULAR; INTRAVENOUS; SUBCUTANEOUS ONCE
Refills: 0 | Status: COMPLETED | OUTPATIENT
Start: 2024-03-27 | End: 2024-03-27

## 2024-03-27 RX ORDER — ZOLPIDEM TARTRATE 10 MG/1
1 TABLET ORAL
Qty: 0 | Refills: 0 | DISCHARGE

## 2024-03-27 RX ORDER — FAMOTIDINE 10 MG/ML
1 INJECTION INTRAVENOUS
Qty: 0 | Refills: 0 | DISCHARGE

## 2024-03-27 RX ORDER — MULTIVIT-MIN/FERROUS GLUCONATE 9 MG/15 ML
1 LIQUID (ML) ORAL
Qty: 0 | Refills: 0 | DISCHARGE

## 2024-03-27 RX ORDER — CLOPIDOGREL BISULFATE 75 MG/1
1 TABLET, FILM COATED ORAL
Qty: 90 | Refills: 3
Start: 2024-03-27 | End: 2025-03-21

## 2024-03-27 RX ORDER — CHOLECALCIFEROL (VITAMIN D3) 125 MCG
1 CAPSULE ORAL
Qty: 0 | Refills: 0 | DISCHARGE

## 2024-03-27 RX ORDER — ASPIRIN/CALCIUM CARB/MAGNESIUM 324 MG
1 TABLET ORAL
Qty: 0 | Refills: 0 | DISCHARGE

## 2024-03-27 RX ADMIN — Medication 81 MILLIGRAM(S): at 13:12

## 2024-03-27 RX ADMIN — SODIUM CHLORIDE 250 MILLILITER(S): 9 INJECTION INTRAMUSCULAR; INTRAVENOUS; SUBCUTANEOUS at 13:12

## 2024-03-27 NOTE — ASU DISCHARGE PLAN (ADULT/PEDIATRIC) - CARE PROVIDER_API CALL
Montana Cordova  Interventional Cardiology  [address] [place] [address], [address]  [place] [address]  Phone: [phone]  Fax: [phone]  Follow Up Time: 2 weeks   Ronald Darden  Cardiology  [address] [place] [address]  Phone: [phone]  Fax: [phone]  Follow Up Time: 2 weeks

## 2024-03-27 NOTE — ASU DISCHARGE PLAN (ADULT/PEDIATRIC) - PROVIDER TOKENS
PROVIDER:[TOKEN:[[id number]],FOLLOWUP:[2 weeks]] PROVIDER:[TOKEN:[[id number]],FOLLOWUP:[2 weeks]]

## 2024-03-27 NOTE — ASU DISCHARGE PLAN (ADULT/PEDIATRIC) - NS MD DC FALL RISK RISK
For information on Fall & Injury Prevention, visit: [email or web address] OR  [email or web address] OR  https://www.cdc.gov/steadi/patient.html

## 2024-03-27 NOTE — CHART NOTE - NSCHARTNOTEFT_GEN_A_CORE
PRE-OP DIAGNOSIS: Unstable Angina     PROCEDURE:     [x] Coronary Angiogram   [x] [facility]       PRIMARY PHYSICIAN:  Dr. Cordova   FELLOW: Dr. Church      PROCEDURE DESCRIPTION:   Anesthesia: Local + Sedation     Access & Closure:   6-Fr Radial Artery => TR band     IV Contrast:  70 mL      ESTIMATED BLOOD LOSS: <10cc  SPECIMENS REMOVED: None    Intervention: PCI w/ balloon angioplasty and PALLAVI x1 to mLAD  Implants: FER 3.0 x 15MM        FINDINGS:   DOMINANCE: Left     LM: Short vessel. Minor luminal irregularities.    LAD: 40% proximal disease. 85% mid disease s/p PCI.   D1: Small vessel. Moderate disease    CX: Large dominant vessel. Minor luminal irregularities  OM1: Mild mid disease  OM2: Mild disease  LPDA: Severe diffuse disease    RCA: Small, non-dominant vessel. Mild disease      LVEDP:  22 mmHg   EF:  55%     POST-OP DIAGNOSIS:  2- Vessel Coronary Artery Disease   85% mid LAD disease s/p PCI   Severe diffuse LPDA disease - medical management       PLAN OF CARE:   [x] Post-procedure LVEDP-guided IV fluids: 150cc/hr x 6hrs  [x] Medications: ASA/Plavix. Increase Rosuvastatin to 20mg. C/w Toprol 25mg.       DISPOSITION:  [x] D/C Home Today PRE-OP DIAGNOSIS: Unstable Angina     PROCEDURE:     [x] Coronary Angiogram   [x] [facility]       PRIMARY PHYSICIAN:  Dr. Cordova   FELLOW: Dr. Church      PROCEDURE DESCRIPTION:   Anesthesia: Local + Sedation     Access & Closure:   6-Fr Radial Artery => TR band     IV Contrast:  70 mL      ESTIMATED BLOOD LOSS: <10cc  SPECIMENS REMOVED: None    Intervention: PCI w/ balloon angioplasty and PALLAVI x1 to mLAD  Implants: FER 3.0 x 15MM        FINDINGS:   DOMINANCE: Left     LM: Short vessel. Minor luminal irregularities.    LAD: 40% proximal disease. 80% mid disease s/p PCI.   D1: Small vessel. Moderate disease    CX: Large dominant vessel. Minor luminal irregularities  OM1: Mild mid disease  OM2: Mild disease  LPDA: Severe diffuse disease    RCA: Small, non-dominant vessel. Mild disease      LVEDP:  22 mmHg   EF:  55%     POST-OP DIAGNOSIS:  2- Vessel Coronary Artery Disease   85% mid LAD disease s/p PCI   Severe diffuse LPDA disease - medical management       PLAN OF CARE:   [x] Post-procedure LVEDP-guided IV fluids: 150cc/hr x 6hrs  [x] Medications: ASA/Plavix. Increase Rosuvastatin to 20mg. C/w Toprol 25mg.       DISPOSITION:  [x] D/C Home Today

## 2024-03-27 NOTE — ASU DISCHARGE PLAN (ADULT/PEDIATRIC) - ASU DC SPECIAL INSTRUCTIONSFT
Activity:  - Do not drive or operate heavy machinery for 24 hours.  - Limit your physical or any strenuous activity for 2 weeks after angioplasty and 48 hours for angiogram. Support the groin site with your hand when you sneeze or cough. No heavy lifting ( objects more then 10 pounds).  - For wrist access, avoid using affected arm for 24 hours after removal of dressing and avoid heavy lifting for 7 days.  Hygiene:  - After 24 hours, you may shower and remove the dressing from the site. Do not tub bathe for one week. Do not rub or apply lotion, cream, powder to the affected site. Leave it open to air.   Diet:   - You may resume your diet. Low Sodium. Low Fat, Low Cholesterol.  If Diabetic - Carbohydrate Consistent Diet.      - Drink extra fluid unless otherwise advised.   Special Instructions:  - Bruising or black and blue at the puncture site is possible.  - If there is bleeding from the puncture site (groin or wrist) apply direct firm pressure on the site and call 911.  - Any sudden swelling, redness, fever, discharge or severe pain, call your physician or call the cath lab.   - If you notice any scab formation in the area avoid touching the site and allow it to heal.  - Numbness or "pins and needle" sensation in the affected arm, hand, leg or if the affected site become cool to touch or pale that persist for extended period of time call your physician immediately to be checked.  - If you developed chest pain, not relieved by your usual routine medication, fainting, lethargy, weakness, report to the nearest emergency room.   - Inform your Dentist or Surgeon if you are taking Aspirin or any antiplatelet medications. Report any bleeding in your urine or stool.   Medications:  - Soreness or tenderness at the site is possible it will diminish over time. You may take Tylenol every 4-6 hours as needed. Nothing stronger is needed.  - If you are diabetic and taking medication containing Metformin, do not take them for 48 hours after the procedure.     Any questions call Cardiac Cath Lab at [phone] or [phone], Monday - Friday from 7 - 9 pm.

## 2024-03-27 NOTE — CHART NOTE - NSCHARTNOTEFT_GEN_A_CORE
PREOPERATIVE DAY OF PROCEDURE EVALUATION:  I have personally seen and examined the patient.  I agree with the history and physical which I have reviewed and noted any changes below.     67 y/o M with PMHx of Bell's palsy, HTN, HLD, obesity, LAURIE on CPAP, GERD, ?h/o AFib (was on Xarelto and taken off it 2 mos ago), non-obstructive CAD by cath in 2007 and 2014, who presented to his cardiologist for a routine follow-up.  Pt does admit to occasional BETTS, on climbing 1-2 flights of stairs, relieved withc rest.  Denies any chest pain, dizziness, n/v, diaphoresis, orthopnea, PND, LE edema, palpitations, syncope.    Pt underwent a CCTA on 3/15/24 which revealed calcium score of 767, calcified and noncalcified plaque throughout the proximal and mid LAD resulting in up to moderate to severe narrowing within the mid segment, and calcified and noncalcified plaque within the distal LCx/PDA resulting in moderate to severe narrowing.  Pt also had a CT FFR as below.  Pt is now referred for [facility] with possible intervention if clinically indicated.     CT FFR 3/18/24:    Left Main: CT-FFR extending to the distal segment with a value of 1.00    LAD: CT-FFR extending to the distal segment with a value of 0.85 and 0.79   in a septal branch with delta FFR >0.12 suspicious for hemodynamically   significant focal lesion    LCX: CT-FFR extending to the distal segment with a value of 0.76 with   delta FFR >0.12 at the distal segment suspicious for hemodynamically   significant focal lesion    RCA: CT-FFR extending to the distal segment with a value of 0.95        Bleeding Risk Score:  0.9%  IVF pre-hydration: 250cc NS bolus  on daily baby ASA, 81mg po x 1 now  -on BB, Ranexa, and statin    VS: 169/81, 54, 16, 96% on RA  Right Sunny: positive  3/21 ECG: sinus ajit @ 53 bpm, 1st degree AV block       (Signed electronically by __________)  03-27-24 @ 13:01

## 2024-03-27 NOTE — ASU PATIENT PROFILE, ADULT - FALL HARM RISK - UNIVERSAL INTERVENTIONS
Bed in lowest position, wheels locked, appropriate side rails in place/Call bell, personal items and telephone in reach/Instruct patient to call for assistance before getting out of bed or chair/Non-slip footwear when patient is out of bed/[place] to call system/Physically safe environment - no spills, clutter or unnecessary equipment/Purposeful Proactive Rounding/Room/bathroom lighting operational, light cord in reach

## 2024-03-27 NOTE — CHART NOTE - NSCHARTNOTEFT_GEN_A_CORE
Cardiology Follow up s/p PCI    NELLIE ORO   68y Male  PAST MEDICAL & SURGICAL HISTORY:  Hypertension      High blood cholesterol      Spinal stenosis      GERD (gastroesophageal reflux disease)      Bell palsy  8/18      LAURIE on CPAP      OA (osteoarthritis)      Obesity  BMI=38.2      Paroxysmal atrial fibrillation      History of total hip replacement  left 2002      H/O hemorrhoidectomy  2013      History of laparoscopic cholecystectomy  2017      History of hernia repair        Allergies    No Known Allergies    Vital Signs Last 24 Hrs  T(C): 36.1 (27 Mar 2024 16:00), Max: 36.2 (27 Mar 2024 12:49)  T(F): 97 (27 Mar 2024 16:00), Max: 97.2 (27 Mar 2024 12:49)  HR: 55 (27 Mar 2024 16:20) (55 - 66)  BP: 121/54 (27 Mar 2024 16:20) (121/54 - 169/81)  BP(mean): --  RR: 16 (27 Mar 2024 16:20) (14 - 16)  SpO2: 98% (27 Mar 2024 16:20) (97% - 98%)    MEDICATIONS  (STANDING):  chlorhexidine 4% Liquid 1 Application(s) Topical once  sodium chloride 0.9%. 900 milliLiter(s) (150 mL/Hr) IV Continuous <Continuous>    REVIEW OF SYSTEMS:          All negative except as mentioned in HPI    PHYSICAL EXAM:           CONSTITUTIONAL: Well-developed; well-nourished; in no acute distress  	SKIN: warm, dry  	HEAD: Normocephalic; atraumatic  	EYES: PERRL.  	ENT: No nasal discharge, airway clear, mucous membranes moist  	NECK: Supple; non tender.  	CARD: +S1, +S2, no murmurs, gallops, or rubs. Regular rate and rhythm    	RESP: No wheezes, rales or rhonchi. CTA B/L  	ABD: soft ntnd, + BS x 4 quadrants  	EXT: moves all extremities,  no clubbing, cyanosis or edema  	NEURO: Alert and oriented x3, no focal deficits          PSYCH: Cooperative, appropriate          VASCULAR:  + Rad / + PTs / +  DPs          EXTREMITY:               Right Radial: Dressing D/C/I, access site soft, no hematoma, no pain, + pulses, no sign of infection, no numbness    LABS:                        15.6   7.64  )-----------( 272      ( 27 Mar 2024 12:50 )             44.1     03-27    137  |  102  |  20  ----------------------------<  98  4.7   |  24  |  1.3    Ca    9.6      27 Mar 2024 12:50      I&O's Summary    27 Mar 2024 07:01  -  27 Mar 2024 16:37  --------------------------------------------------------  IN: 250 mL / OUT: 0 mL / NET: 250 mL        A/P:  I discussed the case with Cardiologist Dr. Cordova and recommend the following:  S/P PCI:  Access & Closure:   6-Fr Radial Artery => TR band     IV Contrast:  70 mL    ESTIMATED BLOOD LOSS: <10cc  SPECIMENS REMOVED: None    Intervention: PCI w/ balloon angioplasty and PALLAVI x1 to mLAD  Implants: FER 3.0 x 15MM    FINDINGS:   DOMINANCE: Left     LM: Short vessel. Minor luminal irregularities.    LAD: 40% proximal disease. 85% mid disease s/p PCI.   D1: Small vessel. Moderate disease    CX: Large dominant vessel. Minor luminal irregularities  OM1: Mild mid disease  OM2: Mild disease  LPDA: Severe diffuse disease    RCA: Small, non-dominant vessel. Mild disease    LVEDP:  22 mmHg   EF:  55%     POST-OP DIAGNOSIS:  2- Vessel Coronary Artery Disease   85% mid LAD disease s/p PCI   Severe diffuse LPDA disease - medical management                                          cc/hr x 6 hr                   CBC/ECG at 8 pm   	   Continue DAPT ( Aspirin 81 mg PO Daily and Plavix 75 mg Daily ), B-Blocker, Statin Therapy, ARB                   Patient given 30 day supply of ( Aspirin 81 mg daily and Plavix 75 mg daily ) to take at home                   Patient agreeing to take DAPT for at least one year or as directed by cardiologist                    Pt given instructions on importance of taking antiplatelet medication or risk acute stent thrombosis/death                   Post cath instructions, access site care and activity restrictions reviewed with patient                     Cardiac rehab information provided/referral and communication to Cardiac Rehab completed                      Benefits of Cardiac Rehab discussed with patient                   Patient instructed to call Cardiac Rehab and make first appointment after first f/u visit with Cardiologist                    Discussed with patient to return to hospital if experience chest pain, shortness breath, dizziness and site bleeding                   Aggressive risk factor modification, diet counseling, smoking cessation discussed with patient                       Can discharge patient from interventional cardiac standpoint at 9:30 pm after ambulating without symptoms and access site wnl, ECG and blood work reviewed                    Follow up with Cardiology Dr. Darden in two weeks. Patient instructed to call and make an appointment                     Discharge instructions as follows, when ready to d/c:    Activity:  - Do not drive or operate heavy machinery for 24 hours.  - Limit your physical or any strenuous activity for 2 weeks after angioplasty and 48 hours for angiogram. Support the groin site with your hand when you sneeze or cough. No heavy lifting ( objects more then 10 pounds).  - For wrist access, avoid using affected arm for 24 hours after removal of dressing and avoid heavy lifting for 7 days.  Hygiene:  - After 24 hours, you may shower and remove the dressing from the site. Do not tub bathe for one week. Do not rub or apply lotion, cream, powder to the affected site. Leave it open to air.   Diet:   - You may resume your diet. Low Sodium. Low Fat, Low Cholesterol.  If Diabetic - Carbohydrate Consistent Diet.      - Drink extra fluid unless otherwise advised.   Special Instructions:  - Bruising or black and blue at the puncture site is possible.  - If there is bleeding from the puncture site (groin or wrist) apply direct firm pressure on the site and call 911.  - Any sudden swelling, redness, fever, discharge or severe pain, call your physician or call the cath lab.   - If you notice any scab formation in the area avoid touching the site and allow it to heal.  - Numbness or "pins and needle" sensation in the affected arm, hand, leg or if the affected site become cool to touch or pale that persist for extended period of time call your physician immediately to be checked.  - If you developed chest pain, not relieved by your usual routine medication, fainting, lethargy, weakness, report to the nearest emergency room.   - Inform your Dentist or Surgeon if you are taking Aspirin or any antiplatelet medications. Report any bleeding in your urine or stool.   Medications:  - Soreness or tenderness at the site is possible it will diminish over time. You may take Tylenol every 4-6 hours as needed. Nothing stronger is needed.  - If you are diabetic and taking medication containing Metformin, do not take them for 48 hours after the procedure.     Any questions call Cardiac Cath Lab at [phone] or [phone], Monday - Friday from 7 - 9 pm.

## 2024-03-29 PROBLEM — I48.0 PAROXYSMAL ATRIAL FIBRILLATION: Chronic | Status: ACTIVE | Noted: 2024-03-21

## 2024-04-03 DIAGNOSIS — I25.110 ATHEROSCLEROTIC HEART DISEASE OF NATIVE CORONARY ARTERY WITH UNSTABLE ANGINA PECTORIS: ICD-10-CM

## 2024-04-03 DIAGNOSIS — E78.5 HYPERLIPIDEMIA, UNSPECIFIED: ICD-10-CM

## 2024-04-03 DIAGNOSIS — I10 ESSENTIAL (PRIMARY) HYPERTENSION: ICD-10-CM

## 2024-04-11 ENCOUNTER — APPOINTMENT (OUTPATIENT)
Dept: CARDIOLOGY | Facility: CLINIC | Age: 69
End: 2024-04-11
Payer: MEDICARE

## 2024-04-11 VITALS — DIASTOLIC BLOOD PRESSURE: 70 MMHG | SYSTOLIC BLOOD PRESSURE: 118 MMHG | HEART RATE: 70 BPM

## 2024-04-11 VITALS — BODY MASS INDEX: 37.25 KG/M2 | HEIGHT: 72 IN | WEIGHT: 275 LBS

## 2024-04-11 DIAGNOSIS — R93.1 ABNORMAL FINDINGS ON DIAGNOSTIC IMAGING OF HEART AND CORONARY CIRCULATION: ICD-10-CM

## 2024-04-11 DIAGNOSIS — I10 ESSENTIAL (PRIMARY) HYPERTENSION: ICD-10-CM

## 2024-04-11 DIAGNOSIS — I48.0 PAROXYSMAL ATRIAL FIBRILLATION: ICD-10-CM

## 2024-04-11 DIAGNOSIS — G47.33 OBSTRUCTIVE SLEEP APNEA (ADULT) (PEDIATRIC): ICD-10-CM

## 2024-04-11 DIAGNOSIS — I20.9 ANGINA PECTORIS, UNSPECIFIED: ICD-10-CM

## 2024-04-11 DIAGNOSIS — I25.10 ATHEROSCLEROTIC HEART DISEASE OF NATIVE CORONARY ARTERY W/OUT ANGINA PECTORIS: ICD-10-CM

## 2024-04-11 DIAGNOSIS — Z98.61 CORONARY ANGIOPLASTY STATUS: ICD-10-CM

## 2024-04-11 DIAGNOSIS — E78.5 HYPERLIPIDEMIA, UNSPECIFIED: ICD-10-CM

## 2024-04-11 PROCEDURE — 99214 OFFICE O/P EST MOD 30 MIN: CPT

## 2024-04-11 RX ORDER — RANOLAZINE 1000 MG/1
1000 TABLET, EXTENDED RELEASE ORAL
Qty: 180 | Refills: 3 | Status: ACTIVE | COMMUNITY
Start: 2024-03-18 | End: 1900-01-01

## 2024-04-11 RX ORDER — NITROGLYCERIN 0.4 MG/1
0.4 TABLET SUBLINGUAL
Refills: 0 | Status: ACTIVE | COMMUNITY
Start: 2024-04-11

## 2024-04-11 RX ORDER — ROSUVASTATIN CALCIUM 40 MG/1
40 TABLET, FILM COATED ORAL DAILY
Qty: 90 | Refills: 3 | Status: ACTIVE | COMMUNITY
Start: 1900-01-01 | End: 1900-01-01

## 2024-04-11 NOTE — HISTORY OF PRESENT ILLNESS
[FreeTextEntry1] : PT with 3/27/24: CATH: mLAD s/p PCI, severe diffuse disease LPDA  3/24: CAC: 767, PAF, LAURIE ON CPAP. DD2, LVH, HYPERTG, HTN, WCH, LAURIE ON CPAP, MULTIPLE HIP AND KNEE SURGERIES.   BP CUFF ACCURATE WITH OFFICE MONITOR in : No AFIB EPISODES  pt gettting tired at times, pt denies cp and denies sob, pt to stop aspirin  echo: : MILD LVH, LVMI: 133 g/m2, Gl=LS: -17.9%  22:  pt saw dr. sow and does not want ILR for now, pt had no episodes of afib on apple watch, pt was advised to continue ac for now  checked, BP HIGH TODAY AND AT HOME 'S AT TIME.  23:  HDL: 50, T, LDL: 80, BNP: 10, TSH: 4.69  pt says takes a nap everyday, reviewed apple watch an no afib, had one listed in  but not afib. pt LAURIE on cpap. Pt feeling more tired and fatigued.   8/3/23: 3/13/23: LEXISCAN: no evidence of myocardial ischemia or scar.  23: EF: 65%, E' sept: 0.09 m/s, E/e': 11, GLS: -19, CO: 6.4 l/min, DD2, aorta: 3.7  LDL 87 HDL 49  pt had afib after watching top gun movie.  PT HAS NOT HAD AFIB since AND HAS APPLE WATCH reviewed, LAST EPISODE .   24: : TSh: elevated mild, GFR: 78 LDL 97 increased and HDL 42, bnp: 36  bp at home [id number] to 155 at home systolic. pt says if eats too much patient feels  reviewed apple watch NO AFIB SINCE .  pt says tired and using cpap but very tired. pt wife saying sob with walking up a hill or fast or going upstairs.   3/18/24:  CTA: CAC: 767  pLAD mod to severe stenosis, LCX:/LPDA: MODERATE TO SEVERE stenosis.  pt to start metoprolol er 25 mg daily, start ranolazine 500 mg po qd, start asa 81 mg daily.  schedule cath with dr. huber. d/w r/bs   24: 3/27/24: CATH: mLAD s/p PCI, severe diffuse disease LPDA (GDMT) wegovy ?, increased hydralazine last visit : LDL 97 A1c: 4.9  pt wrist is stable. pt on rosuvastatin 5 mg po qhs, pt not sure if possible muscle pain on statin. pt says sometimes has a little achiness intermittent felt like when blew up the stent.  pt says has a little angina daily.

## 2024-04-11 NOTE — PHYSICAL EXAM
[Normal S1, S2] : normal S1, S2 [Clear Lung Fields] : clear lung fields [Soft] : abdomen soft [de-identified] : RRR

## 2024-04-11 NOTE — DISCUSSION/SUMMARY
[FreeTextEntry1] : pt CUFF ACCURATE  continue fenofibrate 134 mg  cont hydralazine to 100 mg po tid  continue valsartan/hct 320/25 mg daily  increase rousvastatin to 10 mg po qhs  if on watch no afib at next visit will consider STOP XARELTO Continue Aspirin as CAD bp controlled,  pt was unable to get wegovy as out of stock. will check next visit.  get carotid/ renal u/s as refractory htn 2 d echo  increase ranolazine 1000 q12  give sl ntg  increase rosuvastatin 40 mg po qhs

## 2024-06-21 RX ORDER — SEMAGLUTIDE 1 MG/.5ML
1 INJECTION, SOLUTION SUBCUTANEOUS
Qty: 1 | Refills: 0 | Status: ACTIVE | COMMUNITY
Start: 2024-02-08 | End: 1900-01-01

## 2024-07-30 ENCOUNTER — APPOINTMENT (OUTPATIENT)
Dept: CARDIOLOGY | Facility: CLINIC | Age: 69
End: 2024-07-30
Payer: MEDICARE

## 2024-07-30 PROCEDURE — 93306 TTE W/DOPPLER COMPLETE: CPT

## 2024-07-31 ENCOUNTER — APPOINTMENT (OUTPATIENT)
Dept: CARDIOLOGY | Facility: CLINIC | Age: 69
End: 2024-07-31
Payer: MEDICARE

## 2024-07-31 PROCEDURE — 93975 VASCULAR STUDY: CPT

## 2024-07-31 PROCEDURE — 93880 EXTRACRANIAL BILAT STUDY: CPT

## 2024-08-01 ENCOUNTER — NON-APPOINTMENT (OUTPATIENT)
Age: 69
End: 2024-08-01

## 2024-08-01 PROBLEM — I77.819 DILATION OF AORTA: Status: ACTIVE | Noted: 2024-08-01

## 2024-08-02 ENCOUNTER — APPOINTMENT (OUTPATIENT)
Dept: CARDIOLOGY | Facility: CLINIC | Age: 69
End: 2024-08-02
Payer: MEDICARE

## 2024-08-02 VITALS — DIASTOLIC BLOOD PRESSURE: 70 MMHG | HEART RATE: 65 BPM | SYSTOLIC BLOOD PRESSURE: 112 MMHG

## 2024-08-02 VITALS — HEIGHT: 72 IN | BODY MASS INDEX: 35.89 KG/M2 | WEIGHT: 265 LBS

## 2024-08-02 DIAGNOSIS — I77.819 AORTIC ECTASIA, UNSPECIFIED SITE: ICD-10-CM

## 2024-08-02 DIAGNOSIS — I50.32 CHRONIC DIASTOLIC (CONGESTIVE) HEART FAILURE: ICD-10-CM

## 2024-08-02 DIAGNOSIS — I10 ESSENTIAL (PRIMARY) HYPERTENSION: ICD-10-CM

## 2024-08-02 DIAGNOSIS — I20.9 ANGINA PECTORIS, UNSPECIFIED: ICD-10-CM

## 2024-08-02 DIAGNOSIS — Z98.61 CORONARY ANGIOPLASTY STATUS: ICD-10-CM

## 2024-08-02 DIAGNOSIS — I48.0 PAROXYSMAL ATRIAL FIBRILLATION: ICD-10-CM

## 2024-08-02 DIAGNOSIS — E78.5 HYPERLIPIDEMIA, UNSPECIFIED: ICD-10-CM

## 2024-08-02 DIAGNOSIS — R93.1 ABNORMAL FINDINGS ON DIAGNOSTIC IMAGING OF HEART AND CORONARY CIRCULATION: ICD-10-CM

## 2024-08-02 DIAGNOSIS — R00.1 BRADYCARDIA, UNSPECIFIED: ICD-10-CM

## 2024-08-02 PROCEDURE — G2211 COMPLEX E/M VISIT ADD ON: CPT

## 2024-08-02 PROCEDURE — 99214 OFFICE O/P EST MOD 30 MIN: CPT

## 2024-08-02 RX ORDER — ASPIRIN ENTERIC COATED TABLETS 81 MG 81 MG/1
81 TABLET, DELAYED RELEASE ORAL
Refills: 0 | Status: ACTIVE | COMMUNITY
Start: 2024-08-02

## 2024-08-02 RX ORDER — CLOPIDOGREL BISULFATE 75 MG/1
75 TABLET, FILM COATED ORAL DAILY
Qty: 30 | Refills: 5 | Status: ACTIVE | COMMUNITY
Start: 2024-08-02

## 2024-08-02 NOTE — HISTORY OF PRESENT ILLNESS
[FreeTextEntry1] : PT with 3/27/24: CATH: mLAD s/p PCI, severe diffuse disease LPDA  3/24: CAC: 767, DILATED ASC AO: 4 cm, PAF, LAURIE ON CPAP. DD2, LVH, HYPERTG, HTN, WCH, LAURIE ON CPAP, MULTIPLE HIP AND KNEE SURGERIES.   BP CUFF ACCURATE WITH OFFICE MONITOR in : No AFIB EPISODES  pt gettting tired at times, pt denies cp and denies sob, pt to stop aspirin  echo: : MILD LVH, LVMI: 133 g/m2, Gl=LS: -17.9%  22:  pt saw dr. sow and does not want ILR for now, pt had no episodes of afib on apple watch, pt was advised to continue ac for now  checked, BP HIGH TODAY AND AT HOME 'S AT TIME.  23:  HDL: 50, T, LDL: 80, BNP: 10, TSH: 4.69  pt says takes a nap everyday, reviewed apple watch an no afib, had one listed in  but not afib. pt LAURIE on cpap. Pt feeling more tired and fatigued.   8/3/23: 3/13/23: LEXISCAN: no evidence of myocardial ischemia or scar.  23: EF: 65%, E' sept: 0.09 m/s, E/e': 11, GLS: -19, CO: 6.4 l/min, DD2, aorta: 3.7  LDL 87 HDL 49  pt had afib after watching top gun movie.  PT HAS NOT HAD AFIB since AND HAS APPLE WATCH reviewed, LAST EPISODE .   24: : TSh: elevated mild, GFR: 78 LDL 97 increased and HDL 42, bnp: 36  bp at home [id number] to 155 at home systolic. pt says if eats too much patient feels  reviewed apple watch NO AFIB SINCE .  pt says tired and using cpap but very tired. pt wife saying sob with walking up a hill or fast or going upstairs.   3/18/24:  CTA: CAC: 767  pLAD mod to severe stenosis, LCX:/LPDA: MODERATE TO SEVERE stenosis.  pt to start metoprolol er 25 mg daily, start ranolazine 500 mg po qd, start asa 81 mg daily.  schedule cath with dr. huber. d/w r/bs   24: 3/27/24: CATH: mLAD s/p PCI, severe diffuse disease LPDA (GDMT) wegovy ?, increased hydralazine last visit : LDL 97 A1c: 4.9  pt wrist is stable. pt on rosuvastatin 5 mg po qhs, pt not sure if possible muscle pain on statin. pt says sometimes has a little achiness intermittent felt like when blew up the stent.  pt says has a little angina daily.   24: 24: Renal: result pending  reviewed by me and less than 60% but not great imaging with overlying bowel gas.  24: EF: 60%, LVMI: 113 g/m2, ivsd: 1.5 cm, lvpw: 1.2 cm, E/e': 10.84, lvot vti: 24.1 cm DD2, Asc AO: 3.8 cm SoV: 4 cm., mild LAE, mild LVH GLS: -22% but not accurate as poor tracking. ranexa increased due to mild angina last visit and now lvfp has improved.  24: Carotid: less than 50% b/l ica, Moderate calcified atherosclerosis b/l bifurcation. Nodule visualized on the left lobe of the thyroid. : TSH: 5.19 elevated, gfr: 68 bnp: 37 LDL 63 HDL 43  anginal stable on ranolazine, pt having problems with OA OF KNEE and getting injections of knee. pt had gel shot. pt palnning [place] in september.  XARELTO STOPPED AND NO EPISODES OF AFIB.

## 2024-08-02 NOTE — PHYSICAL EXAM
[Normal S1, S2] : normal S1, S2 [Clear Lung Fields] : clear lung fields [Soft] : abdomen soft [de-identified] : RRR

## 2024-08-02 NOTE — PHYSICAL EXAM
[Normal S1, S2] : normal S1, S2 [Clear Lung Fields] : clear lung fields [Soft] : abdomen soft [de-identified] : RRR

## 2024-08-02 NOTE — PHYSICAL EXAM
[Normal S1, S2] : normal S1, S2 [Clear Lung Fields] : clear lung fields [Soft] : abdomen soft [de-identified] : RRR

## 2024-08-02 NOTE — HISTORY OF PRESENT ILLNESS
[FreeTextEntry1] : PT with 3/27/24: CATH: mLAD s/p PCI, severe diffuse disease LPDA  3/24: CAC: 767, DILATED ASC AO: 4 cm, PAF, LAURIE ON CPAP. DD2, LVH, HYPERTG, HTN, WCH, LAURIE ON CPAP, MULTIPLE HIP AND KNEE SURGERIES.   BP CUFF ACCURATE WITH OFFICE MONITOR in : No AFIB EPISODES  pt gettting tired at times, pt denies cp and denies sob, pt to stop aspirin  echo: : MILD LVH, LVMI: 133 g/m2, Gl=LS: -17.9%  22:  pt saw dr. sow and does not want ILR for now, pt had no episodes of afib on apple watch, pt was advised to continue ac for now  checked, BP HIGH TODAY AND AT HOME 'S AT TIME.  23:  HDL: 50, T, LDL: 80, BNP: 10, TSH: 4.69  pt says takes a nap everyday, reviewed apple watch an no afib, had one listed in  but not afib. pt LAURIE on cpap. Pt feeling more tired and fatigued.   8/3/23: 3/13/23: LEXISCAN: no evidence of myocardial ischemia or scar.  23: EF: 65%, E' sept: 0.09 m/s, E/e': 11, GLS: -19, CO: 6.4 l/min, DD2, aorta: 3.7  LDL 87 HDL 49  pt had afib after watching top gun movie.  PT HAS NOT HAD AFIB since AND HAS APPLE WATCH reviewed, LAST EPISODE .   24: : TSh: elevated mild, GFR: 78 LDL 97 increased and HDL 42, bnp: 36  bp at home [id number] to 155 at home systolic. pt says if eats too much patient feels  reviewed apple watch NO AFIB SINCE .  pt says tired and using cpap but very tired. pt wife saying sob with walking up a hill or fast or going upstairs.   3/18/24:  CTA: CAC: 767  pLAD mod to severe stenosis, LCX:/LPDA: MODERATE TO SEVERE stenosis.  pt to start metoprolol er 25 mg daily, start ranolazine 500 mg po qd, start asa 81 mg daily.  schedule cath with dr. huber. d/w r/bs   24: 3/27/24: CATH: mLAD s/p PCI, severe diffuse disease LPDA (GDMT) wegovy ?, increased hydralazine last visit : LDL 97 A1c: 4.9  pt wrist is stable. pt on rosuvastatin 5 mg po qhs, pt not sure if possible muscle pain on statin. pt says sometimes has a little achiness intermittent felt like when blew up the stent.  pt says has a little angina daily.   24: 24: Renal: result pending  reviewed by me and less than 60% but not great imaging with overlying bowel gas.  24: EF: 60%, LVMI: 113 g/m2, ivsd: 1.5 cm, lvpw: 1.2 cm, E/e': 10.84, lvot vti: 24.1 cm DD2, Asc AO: 3.8 cm SoV: 4 cm., mild LAE, mild LVH GLS: -22% but not accurate as poor tracking. ranexa increased due to mild angina last visit and now lvfp has improved.  24: Carotid: less than 50% b/l ica, Moderate calcified atherosclerosis b/l bifurcation. Nodule visualized on the left lobe of the thyroid. : TSH: 5.19 elevated, gfr: 68 bnp: 37 LDL 63 HDL 43  anginal stable on ranolazine, pt having problems with OA OF KNEE and getting injections of knee. pt had gel shot. pt pal[place]ning [place] in sept[place].  XARELTO STOPPED AND NO EPISODES OF AFIB.

## 2024-08-02 NOTE — DISCUSSION/SUMMARY
[FreeTextEntry1] : pt CUFF ACCURATE  continue fenofibrate 134 mg  cont hydralazine to 100 mg po tid  continue valsartan/hct 320/25 mg daily  increase rousvastatin to 10 mg po qhs  if on watch no afib at next visit will consider STOPPING XARELTO Continue Aspirin as CAD pt was unable to get wegovy as out of stock. will check next visit.  continue ranolazine 1000 q12  give sl ntg  continue rosuvastatin 40 mg po qhs  send to endocrine for thyroid  get bloodwork f/u in 4 months  better control cholesterol.

## 2024-08-05 ENCOUNTER — APPOINTMENT (OUTPATIENT)
Dept: ELECTROPHYSIOLOGY | Facility: CLINIC | Age: 69
End: 2024-08-05

## 2024-08-21 ENCOUNTER — RX RENEWAL (OUTPATIENT)
Age: 69
End: 2024-08-21

## 2024-11-06 ENCOUNTER — RESULT REVIEW (OUTPATIENT)
Age: 69
End: 2024-11-06

## 2024-12-20 ENCOUNTER — APPOINTMENT (OUTPATIENT)
Dept: CARDIOLOGY | Facility: CLINIC | Age: 69
End: 2024-12-20
Payer: MEDICARE

## 2024-12-20 VITALS
HEIGHT: 72 IN | BODY MASS INDEX: 34.67 KG/M2 | SYSTOLIC BLOOD PRESSURE: 121 MMHG | DIASTOLIC BLOOD PRESSURE: 77 MMHG | WEIGHT: 256 LBS | HEART RATE: 67 BPM

## 2024-12-20 DIAGNOSIS — Z98.61 CORONARY ANGIOPLASTY STATUS: ICD-10-CM

## 2024-12-20 DIAGNOSIS — I77.819 AORTIC ECTASIA, UNSPECIFIED SITE: ICD-10-CM

## 2024-12-20 DIAGNOSIS — I25.10 ATHEROSCLEROTIC HEART DISEASE OF NATIVE CORONARY ARTERY W/OUT ANGINA PECTORIS: ICD-10-CM

## 2024-12-20 DIAGNOSIS — G47.33 OBSTRUCTIVE SLEEP APNEA (ADULT) (PEDIATRIC): ICD-10-CM

## 2024-12-20 DIAGNOSIS — R93.1 ABNORMAL FINDINGS ON DIAGNOSTIC IMAGING OF HEART AND CORONARY CIRCULATION: ICD-10-CM

## 2024-12-20 DIAGNOSIS — E66.01 MORBID (SEVERE) OBESITY DUE TO EXCESS CALORIES: ICD-10-CM

## 2024-12-20 DIAGNOSIS — I10 ESSENTIAL (PRIMARY) HYPERTENSION: ICD-10-CM

## 2024-12-20 DIAGNOSIS — I20.9 ANGINA PECTORIS, UNSPECIFIED: ICD-10-CM

## 2024-12-20 DIAGNOSIS — R42 DIZZINESS AND GIDDINESS: ICD-10-CM

## 2024-12-20 DIAGNOSIS — I50.32 CHRONIC DIASTOLIC (CONGESTIVE) HEART FAILURE: ICD-10-CM

## 2024-12-20 DIAGNOSIS — E66.9 OBESITY, UNSPECIFIED: ICD-10-CM

## 2024-12-20 DIAGNOSIS — E78.5 HYPERLIPIDEMIA, UNSPECIFIED: ICD-10-CM

## 2024-12-20 DIAGNOSIS — I48.0 PAROXYSMAL ATRIAL FIBRILLATION: ICD-10-CM

## 2024-12-20 PROCEDURE — 93000 ELECTROCARDIOGRAM COMPLETE: CPT

## 2024-12-20 PROCEDURE — 99214 OFFICE O/P EST MOD 30 MIN: CPT

## 2024-12-20 PROCEDURE — G2211 COMPLEX E/M VISIT ADD ON: CPT

## 2024-12-20 RX ORDER — TIRZEPATIDE 2.5 MG/.5ML
2.5 INJECTION, SOLUTION SUBCUTANEOUS
Qty: 4 | Refills: 0 | Status: ACTIVE | COMMUNITY
Start: 2024-12-20 | End: 1900-01-01

## 2025-01-16 ENCOUNTER — APPOINTMENT (OUTPATIENT)
Dept: ORTHOPEDIC SURGERY | Facility: CLINIC | Age: 70
End: 2025-01-16
Payer: MEDICARE

## 2025-01-16 DIAGNOSIS — S79.912A UNSPECIFIED INJURY OF LEFT HIP, INITIAL ENCOUNTER: ICD-10-CM

## 2025-01-16 DIAGNOSIS — S49.92XA UNSPECIFIED INJURY OF LEFT SHOULDER AND UPPER ARM, INITIAL ENCOUNTER: ICD-10-CM

## 2025-01-16 PROCEDURE — 73030 X-RAY EXAM OF SHOULDER: CPT | Mod: LT

## 2025-01-16 PROCEDURE — 73502 X-RAY EXAM HIP UNI 2-3 VIEWS: CPT

## 2025-01-16 PROCEDURE — 99214 OFFICE O/P EST MOD 30 MIN: CPT

## 2025-01-28 ENCOUNTER — APPOINTMENT (OUTPATIENT)
Dept: ORTHOPEDIC SURGERY | Facility: CLINIC | Age: 70
End: 2025-01-28
Payer: MEDICARE

## 2025-01-28 ENCOUNTER — RESULT CHARGE (OUTPATIENT)
Age: 70
End: 2025-01-28

## 2025-01-28 DIAGNOSIS — Z96.649 PERIPROSTHETIC FRACTURE AROUND OTHER INTERNAL PROSTHETIC JOINT, INITIAL ENCOUNTER: ICD-10-CM

## 2025-01-28 DIAGNOSIS — M97.8XXA PERIPROSTHETIC FRACTURE AROUND OTHER INTERNAL PROSTHETIC JOINT, INITIAL ENCOUNTER: ICD-10-CM

## 2025-01-28 PROCEDURE — 99213 OFFICE O/P EST LOW 20 MIN: CPT

## 2025-01-28 PROCEDURE — 73521 X-RAY EXAM HIPS BI 2 VIEWS: CPT

## 2025-01-28 RX ORDER — TRAMADOL HYDROCHLORIDE 50 MG/1
50 TABLET, COATED ORAL
Qty: 10 | Refills: 0 | Status: ACTIVE | COMMUNITY
Start: 2025-01-28 | End: 1900-01-01

## 2025-02-11 ENCOUNTER — APPOINTMENT (OUTPATIENT)
Dept: ORTHOPEDIC SURGERY | Facility: CLINIC | Age: 70
End: 2025-02-11
Payer: MEDICARE

## 2025-02-11 DIAGNOSIS — Z96.649 PERIPROSTHETIC FRACTURE AROUND OTHER INTERNAL PROSTHETIC JOINT, INITIAL ENCOUNTER: ICD-10-CM

## 2025-02-11 DIAGNOSIS — M97.8XXA PERIPROSTHETIC FRACTURE AROUND OTHER INTERNAL PROSTHETIC JOINT, INITIAL ENCOUNTER: ICD-10-CM

## 2025-02-11 PROCEDURE — 73502 X-RAY EXAM HIP UNI 2-3 VIEWS: CPT

## 2025-02-11 PROCEDURE — 99203 OFFICE O/P NEW LOW 30 MIN: CPT

## 2025-03-04 ENCOUNTER — APPOINTMENT (OUTPATIENT)
Dept: ORTHOPEDIC SURGERY | Facility: CLINIC | Age: 70
End: 2025-03-04

## 2025-03-04 DIAGNOSIS — S79.912A UNSPECIFIED INJURY OF LEFT HIP, INITIAL ENCOUNTER: ICD-10-CM

## 2025-03-04 PROCEDURE — 99213 OFFICE O/P EST LOW 20 MIN: CPT

## 2025-03-04 PROCEDURE — 73502 X-RAY EXAM HIP UNI 2-3 VIEWS: CPT

## 2025-03-04 PROCEDURE — 99203 OFFICE O/P NEW LOW 30 MIN: CPT

## 2025-04-07 ENCOUNTER — RX RENEWAL (OUTPATIENT)
Age: 70
End: 2025-04-07

## 2025-04-15 ENCOUNTER — APPOINTMENT (OUTPATIENT)
Dept: ORTHOPEDIC SURGERY | Facility: CLINIC | Age: 70
End: 2025-04-15
Payer: MEDICARE

## 2025-04-15 DIAGNOSIS — S79.912A UNSPECIFIED INJURY OF LEFT HIP, INITIAL ENCOUNTER: ICD-10-CM

## 2025-04-15 PROCEDURE — 99213 OFFICE O/P EST LOW 20 MIN: CPT

## 2025-04-15 PROCEDURE — 73502 X-RAY EXAM HIP UNI 2-3 VIEWS: CPT

## 2025-04-22 ENCOUNTER — APPOINTMENT (OUTPATIENT)
Dept: CARDIOLOGY | Facility: CLINIC | Age: 70
End: 2025-04-22
Payer: MEDICARE

## 2025-04-22 ENCOUNTER — NON-APPOINTMENT (OUTPATIENT)
Age: 70
End: 2025-04-22

## 2025-04-22 VITALS
HEIGHT: 72 IN | DIASTOLIC BLOOD PRESSURE: 76 MMHG | OXYGEN SATURATION: 95 % | RESPIRATION RATE: 14 BRPM | WEIGHT: 265 LBS | BODY MASS INDEX: 35.89 KG/M2 | SYSTOLIC BLOOD PRESSURE: 120 MMHG | HEART RATE: 61 BPM

## 2025-04-22 DIAGNOSIS — R93.1 ABNORMAL FINDINGS ON DIAGNOSTIC IMAGING OF HEART AND CORONARY CIRCULATION: ICD-10-CM

## 2025-04-22 DIAGNOSIS — I50.32 CHRONIC DIASTOLIC (CONGESTIVE) HEART FAILURE: ICD-10-CM

## 2025-04-22 DIAGNOSIS — E66.9 OBESITY, UNSPECIFIED: ICD-10-CM

## 2025-04-22 DIAGNOSIS — E78.5 HYPERLIPIDEMIA, UNSPECIFIED: ICD-10-CM

## 2025-04-22 DIAGNOSIS — I10 ESSENTIAL (PRIMARY) HYPERTENSION: ICD-10-CM

## 2025-04-22 DIAGNOSIS — Z98.61 CORONARY ANGIOPLASTY STATUS: ICD-10-CM

## 2025-04-22 DIAGNOSIS — I25.10 ATHEROSCLEROTIC HEART DISEASE OF NATIVE CORONARY ARTERY W/OUT ANGINA PECTORIS: ICD-10-CM

## 2025-04-22 DIAGNOSIS — I20.9 ANGINA PECTORIS, UNSPECIFIED: ICD-10-CM

## 2025-04-22 DIAGNOSIS — I77.819 AORTIC ECTASIA, UNSPECIFIED SITE: ICD-10-CM

## 2025-04-22 PROCEDURE — 99214 OFFICE O/P EST MOD 30 MIN: CPT

## 2025-04-22 PROCEDURE — 93000 ELECTROCARDIOGRAM COMPLETE: CPT

## 2025-06-24 NOTE — HISTORY OF PRESENT ILLNESS
Copied from CRM #[id number]. Topic: General Inquiry - Return Call  >> Jun 24, 2025 10:00 AM Rey wrote:  Who Called: [address]    Patient is returning phone call    Who Left Message for Patient:Matthew  Does the patient know what this is regarding?:results      Preferred Method of Contact: Phone Call  Patient's Preferred Phone Number on File: [phone]   Best Call Back Number, if different:  Additional Information:   [[email or web address]] : PT with 3/27/24: CATH: mLAD s/p PCI, severe diffuse disease LPDA  3/24: CAC: 767, DILATED ASC AO: 4 cm, PAF, LAURIE ON CPAP. DD2, LVH, HYPERTG, HTN, WCH, LAURIE ON CPAP, MULTIPLE HIP AND KNEE SURGERIES.   BP CUFF ACCURATE WITH OFFICE MONITOR in : No AFIB EPISODES  pt gettting tired at times, pt denies cp and denies sob, pt to stop aspirin  echo: : MILD LVH, LVMI: 133 g/m2, Gl=LS: -17.9%  22:  pt saw dr. sow and does not want ILR for now, pt had no episodes of afib on apple watch, pt was advised to continue ac for now  checked, BP HIGH TODAY AND AT HOME 'S AT TIME.  23:  HDL: 50, T, LDL: 80, BNP: 10, TSH: 4.69  pt says takes a nap everyday, reviewed apple watch an no afib, had one listed in  but not afib. pt LAURIE on cpap. Pt feeling more tired and fatigued.   8/3/23: 3/13/23: LEXISCAN: no evidence of myocardial ischemia or scar.  23: EF: 65%, E' sept: 0.09 m/s, E/e': 11, GLS: -19, CO: 6.4 l/min, DD2, aorta: 3.7  LDL 87 HDL 49  pt had afib after watching top gun movie.  PT HAS NOT HAD AFIB since AND HAS APPLE WATCH reviewed, LAST EPISODE .   24: : TSh: elevated mild, GFR: 78 LDL 97 increased and HDL 42, bnp: 36  bp at home [id number] to 155 at home systolic. pt says if eats too much patient feels  reviewed apple watch NO AFIB SINCE .  pt says tired and using cpap but very tired. pt wife saying sob with walking up a hill or fast or going upstairs.   3/18/24:  CTA: CAC: 767  pLAD mod to severe stenosis, LCX:/LPDA: MODERATE TO SEVERE stenosis.  pt to start metoprolol er 25 mg daily, start ranolazine 500 mg po qd, start asa 81 mg daily.  schedule cath with dr. huber. d/w r/bs   24: 3/27/24: CATH: mLAD s/p PCI, severe diffuse disease LPDA (GDMT) wegovy ?, increased hydralazine last visit : LDL 97 A1c: 4.9  pt wrist is stable. pt on rosuvastatin 5 mg po qhs, pt not sure if possible muscle pain on statin. pt says sometimes has a little achiness intermittent felt like when blew up the stent.  pt says has a little angina daily.   24: 24: Renal: result pending  reviewed by me and less than 60% but not great imaging with overlying bowel gas.  24: EF: 60%, LVMI: 113 g/m2, ivsd: 1.5 cm, lvpw: 1.2 cm, E/e': 10.84, lvot vti: 24.1 cm DD2, Asc AO: 3.8 cm SoV: 4 cm., mild LAE, mild LVH GLS: -22% but not accurate as poor tracking. ranexa increased due to mild angina last visit and now lvfp has improved.  24: Carotid: less than 50% b/l ica, Moderate calcified atherosclerosis b/l bifurcation. Nodule visualized on the left lobe of the thyroid. : TSH: 5.19 elevated, gfr: 68 bnp: 37 LDL 63 HDL 43  anginal stable on ranolazine, pt having problems with OA OF KNEE and getting injections of knee. pt had gel shot. [place].  XARELTO STOPPED AND NO EPISODES OF AFIB.

## 2025-08-05 ENCOUNTER — APPOINTMENT (OUTPATIENT)
Dept: CARDIOLOGY | Facility: CLINIC | Age: 70
End: 2025-08-05
Payer: MEDICARE

## 2025-08-05 PROCEDURE — 93306 TTE W/DOPPLER COMPLETE: CPT

## 2025-08-06 ENCOUNTER — APPOINTMENT (OUTPATIENT)
Dept: CARDIOLOGY | Facility: CLINIC | Age: 70
End: 2025-08-06
Payer: MEDICARE

## 2025-08-06 PROCEDURE — 93880 EXTRACRANIAL BILAT STUDY: CPT

## 2025-08-11 ENCOUNTER — APPOINTMENT (OUTPATIENT)
Dept: CARDIOLOGY | Facility: CLINIC | Age: 70
End: 2025-08-11

## 2025-08-11 VITALS — SYSTOLIC BLOOD PRESSURE: 132 MMHG | DIASTOLIC BLOOD PRESSURE: 88 MMHG

## 2025-08-11 VITALS
WEIGHT: 270 LBS | BODY MASS INDEX: 36.57 KG/M2 | HEART RATE: 62 BPM | HEIGHT: 72 IN | SYSTOLIC BLOOD PRESSURE: 145 MMHG | DIASTOLIC BLOOD PRESSURE: 80 MMHG

## 2025-08-11 DIAGNOSIS — R93.1 ABNORMAL FINDINGS ON DIAGNOSTIC IMAGING OF HEART AND CORONARY CIRCULATION: ICD-10-CM

## 2025-08-11 DIAGNOSIS — I50.32 CHRONIC DIASTOLIC (CONGESTIVE) HEART FAILURE: ICD-10-CM

## 2025-08-11 DIAGNOSIS — I10 ESSENTIAL (PRIMARY) HYPERTENSION: ICD-10-CM

## 2025-08-11 DIAGNOSIS — G47.33 OBSTRUCTIVE SLEEP APNEA (ADULT) (PEDIATRIC): ICD-10-CM

## 2025-08-11 DIAGNOSIS — Z98.61 CORONARY ANGIOPLASTY STATUS: ICD-10-CM

## 2025-08-11 DIAGNOSIS — I25.10 ATHEROSCLEROTIC HEART DISEASE OF NATIVE CORONARY ARTERY W/OUT ANGINA PECTORIS: ICD-10-CM

## 2025-08-11 DIAGNOSIS — I77.819 AORTIC ECTASIA, UNSPECIFIED SITE: ICD-10-CM

## 2025-08-11 DIAGNOSIS — I48.0 PAROXYSMAL ATRIAL FIBRILLATION: ICD-10-CM

## 2025-08-11 DIAGNOSIS — I20.9 ANGINA PECTORIS, UNSPECIFIED: ICD-10-CM

## 2025-08-11 PROCEDURE — 99214 OFFICE O/P EST MOD 30 MIN: CPT

## 2025-08-11 PROCEDURE — G2211 COMPLEX E/M VISIT ADD ON: CPT
